# Patient Record
Sex: FEMALE | Race: WHITE | NOT HISPANIC OR LATINO | URBAN - METROPOLITAN AREA
[De-identification: names, ages, dates, MRNs, and addresses within clinical notes are randomized per-mention and may not be internally consistent; named-entity substitution may affect disease eponyms.]

---

## 2021-09-08 ENCOUNTER — APPOINTMENT (OUTPATIENT)
Dept: ULTRASOUND IMAGING | Age: 26
End: 2021-09-08
Attending: EMERGENCY MEDICINE

## 2021-09-08 ENCOUNTER — HOSPITAL ENCOUNTER (EMERGENCY)
Age: 26
Discharge: HOME | End: 2021-09-08
Attending: EMERGENCY MEDICINE

## 2021-09-08 VITALS
HEART RATE: 68 BPM | DIASTOLIC BLOOD PRESSURE: 75 MMHG | OXYGEN SATURATION: 100 % | TEMPERATURE: 97.9 F | SYSTOLIC BLOOD PRESSURE: 109 MMHG | RESPIRATION RATE: 14 BRPM

## 2021-09-08 DIAGNOSIS — O03.9 MISCARRIAGE: Primary | ICD-10-CM

## 2021-09-08 LAB
ABO + RH BLD: NORMAL
ALBUMIN SERPL-MCNC: 3.8 G/DL (ref 3.6–5.1)
ALBUMIN/GLOB SERPL: 1.1 {RATIO} (ref 1–2.4)
ALP SERPL-CCNC: 54 UNITS/L (ref 45–117)
ALT SERPL-CCNC: 24 UNITS/L
ANION GAP SERPL CALC-SCNC: 10 MMOL/L (ref 10–20)
APPEARANCE UR: ABNORMAL
AST SERPL-CCNC: 15 UNITS/L
BACTERIA #/AREA URNS HPF: ABNORMAL /HPF
BASOPHILS # BLD: 0 K/MCL (ref 0–0.3)
BASOPHILS NFR BLD: 1 %
BILIRUB SERPL-MCNC: 0.5 MG/DL (ref 0.2–1)
BILIRUB UR QL STRIP: NEGATIVE
BUN SERPL-MCNC: 11 MG/DL (ref 6–20)
BUN/CREAT SERPL: 14 (ref 7–25)
CALCIUM SERPL-MCNC: 9.1 MG/DL (ref 8.4–10.2)
CHLORIDE SERPL-SCNC: 110 MMOL/L (ref 98–107)
CO2 SERPL-SCNC: 25 MMOL/L (ref 21–32)
COLOR UR: YELLOW
CREAT SERPL-MCNC: 0.79 MG/DL (ref 0.51–0.95)
DEPRECATED RDW RBC: 39.6 FL (ref 39–50)
EOSINOPHIL # BLD: 0 K/MCL (ref 0–0.5)
EOSINOPHIL NFR BLD: 0 %
ERYTHROCYTE [DISTWIDTH] IN BLOOD: 11.7 % (ref 11–15)
FASTING DURATION TIME PATIENT: ABNORMAL H
GFR SERPLBLD BASED ON 1.73 SQ M-ARVRAT: >90 ML/MIN
GLOBULIN SER-MCNC: 3.4 G/DL (ref 2–4)
GLUCOSE SERPL-MCNC: 92 MG/DL (ref 65–99)
GLUCOSE UR STRIP-MCNC: NEGATIVE MG/DL
HCG SERPL-ACNC: 4 MUNITS/ML
HCT VFR BLD CALC: 37.8 % (ref 36–46.5)
HGB BLD-MCNC: 12.8 G/DL (ref 12–15.5)
HGB UR QL STRIP: ABNORMAL
HYALINE CASTS #/AREA URNS LPF: ABNORMAL /LPF
IMM GRANULOCYTES # BLD AUTO: 0 K/MCL (ref 0–0.2)
IMM GRANULOCYTES # BLD: 0 %
KETONES UR STRIP-MCNC: ABNORMAL MG/DL
LEUKOCYTE ESTERASE UR QL STRIP: ABNORMAL
LYMPHOCYTES # BLD: 2.1 K/MCL (ref 1–4.8)
LYMPHOCYTES NFR BLD: 31 %
MCH RBC QN AUTO: 31.4 PG (ref 26–34)
MCHC RBC AUTO-ENTMCNC: 33.9 G/DL (ref 32–36.5)
MCV RBC AUTO: 92.9 FL (ref 78–100)
MONOCYTES # BLD: 0.4 K/MCL (ref 0.3–0.9)
MONOCYTES NFR BLD: 6 %
MUCOUS THREADS URNS QL MICRO: PRESENT
NEUTROPHILS # BLD: 4.1 K/MCL (ref 1.8–7.7)
NEUTROPHILS NFR BLD: 62 %
NITRITE UR QL STRIP: NEGATIVE
NRBC BLD MANUAL-RTO: 0 /100 WBC
PH UR STRIP: 5 [PH] (ref 5–7)
PLATELET # BLD AUTO: 192 K/MCL (ref 140–450)
POTASSIUM SERPL-SCNC: 4.5 MMOL/L (ref 3.4–5.1)
PROT SERPL-MCNC: 7.2 G/DL (ref 6.4–8.2)
PROT UR STRIP-MCNC: 30 MG/DL
RAINBOW EXTRA TUBES HOLD SPECIMEN: NORMAL
RBC # BLD: 4.07 MIL/MCL (ref 4–5.2)
RBC #/AREA URNS HPF: >100 /HPF
SODIUM SERPL-SCNC: 140 MMOL/L (ref 135–145)
SP GR UR STRIP: 1.01 (ref 1–1.03)
SQUAMOUS #/AREA URNS HPF: ABNORMAL /HPF
UROBILINOGEN UR STRIP-MCNC: 0.2 MG/DL
WBC # BLD: 6.6 K/MCL (ref 4.2–11)
WBC #/AREA URNS HPF: ABNORMAL /HPF

## 2021-09-08 PROCEDURE — 99284 EMERGENCY DEPT VISIT MOD MDM: CPT | Performed by: EMERGENCY MEDICINE

## 2021-09-08 PROCEDURE — 81001 URINALYSIS AUTO W/SCOPE: CPT | Performed by: EMERGENCY MEDICINE

## 2021-09-08 PROCEDURE — 87077 CULTURE AEROBIC IDENTIFY: CPT | Performed by: EMERGENCY MEDICINE

## 2021-09-08 PROCEDURE — 85025 COMPLETE CBC W/AUTO DIFF WBC: CPT | Performed by: EMERGENCY MEDICINE

## 2021-09-08 PROCEDURE — 36415 COLL VENOUS BLD VENIPUNCTURE: CPT

## 2021-09-08 PROCEDURE — 84702 CHORIONIC GONADOTROPIN TEST: CPT | Performed by: EMERGENCY MEDICINE

## 2021-09-08 PROCEDURE — 80053 COMPREHEN METABOLIC PANEL: CPT | Performed by: EMERGENCY MEDICINE

## 2021-09-08 PROCEDURE — 86900 BLOOD TYPING SEROLOGIC ABO: CPT | Performed by: EMERGENCY MEDICINE

## 2021-09-08 PROCEDURE — 76830 TRANSVAGINAL US NON-OB: CPT

## 2021-09-08 PROCEDURE — 76856 US EXAM PELVIC COMPLETE: CPT

## 2021-09-08 PROCEDURE — 99284 EMERGENCY DEPT VISIT MOD MDM: CPT

## 2021-09-08 ASSESSMENT — PAIN SCALES - GENERAL: PAINLEVEL_OUTOF10: 2

## 2021-09-08 ASSESSMENT — PAIN DESCRIPTION - PAIN TYPE: TYPE: ACUTE PAIN

## 2021-09-09 LAB — BACTERIA UR CULT: ABNORMAL

## 2021-12-15 PROBLEM — Z34.01 ENCOUNTER FOR SUPERVISION OF NORMAL FIRST PREGNANCY IN FIRST TRIMESTER (HCC): Status: ACTIVE | Noted: 2021-12-15

## 2021-12-15 PROBLEM — Z34.01 ENCOUNTER FOR SUPERVISION OF NORMAL FIRST PREGNANCY IN FIRST TRIMESTER: Status: ACTIVE | Noted: 2021-12-15

## 2021-12-22 PROBLEM — O98.512 COVID-19 AFFECTING PREGNANCY IN SECOND TRIMESTER: Status: ACTIVE | Noted: 2021-12-21

## 2021-12-22 PROBLEM — U07.1 COVID-19 AFFECTING PREGNANCY IN SECOND TRIMESTER (HCC): Status: ACTIVE | Noted: 2021-12-21

## 2021-12-22 PROBLEM — U07.1 COVID-19 AFFECTING PREGNANCY IN SECOND TRIMESTER: Status: ACTIVE | Noted: 2021-12-21

## 2021-12-22 PROBLEM — O98.512 COVID-19 AFFECTING PREGNANCY IN SECOND TRIMESTER (HCC): Status: ACTIVE | Noted: 2021-12-21

## 2022-02-08 PROBLEM — Z34.00 SUPERVISION OF NORMAL FIRST PREGNANCY, ANTEPARTUM: Status: ACTIVE | Noted: 2021-12-15

## 2022-02-08 PROBLEM — Z34.00 SUPERVISION OF NORMAL FIRST PREGNANCY, ANTEPARTUM (HCC): Status: ACTIVE | Noted: 2021-12-15

## 2023-08-04 LAB
AMB EXT CHLAMYDIA, NUCLEIC ACID AMP: NEGATIVE
AMB EXT GONOCOCCUS, NUCLEIC ACID AMP: NEGATIVE

## 2023-08-30 LAB
AMB EXT HEMATOCRIT: 38.3
AMB EXT HEMOGLOBIN: 13.2
AMB EXT MCV: 89.7
AMB EXT PLATELETS: 190
ANTIBODY SCREEN OB: NEGATIVE
HEPATITIS B SURFACE ANTIGEN OB: NEGATIVE
HIV RESULT OB: NEGATIVE
RAPID PLASMA REAGIN OB: NONREACTIVE
RH FACTOR OB: POSITIVE

## 2024-01-08 LAB
AMB EXT GLUCOSE 1HR OB: 122
AMB EXT HEMATOCRIT: 33.2
AMB EXT HEMOGLOBIN: 11.2
AMB EXT PLATELETS: 175
HIV RESULT OB: NEGATIVE

## 2024-02-05 ENCOUNTER — HOSPITAL ENCOUNTER (INPATIENT)
Facility: HOSPITAL | Age: 29
LOS: 1 days | Discharge: HOME | End: 2024-02-06
Attending: OBSTETRICS & GYNECOLOGY | Admitting: OBSTETRICS & GYNECOLOGY
Payer: COMMERCIAL

## 2024-02-05 PROBLEM — Z34.90 PREGNANCY (HCC): Status: ACTIVE | Noted: 2024-02-05

## 2024-02-05 PROBLEM — Z34.90 PREGNANCY: Status: ACTIVE | Noted: 2024-02-05

## 2024-02-05 LAB
ANTIBODY SCREEN: NEGATIVE
BASOPHILS # BLD AUTO: 0.01 X10(3) UL (ref 0–0.2)
BASOPHILS NFR BLD AUTO: 0.1 %
BILIRUB UR QL: NEGATIVE
COLOR UR: YELLOW
DEPRECATED RDW RBC AUTO: 42.3 FL (ref 35.1–46.3)
EOSINOPHIL # BLD AUTO: 0.05 X10(3) UL (ref 0–0.7)
EOSINOPHIL NFR BLD AUTO: 0.6 %
ERYTHROCYTE [DISTWIDTH] IN BLOOD BY AUTOMATED COUNT: 12.2 % (ref 11–15)
FIBRONECTIN FETAL SPEC QL: NEGATIVE
GLUCOSE UR-MCNC: NORMAL MG/DL
HCT VFR BLD AUTO: 33.4 %
HGB BLD-MCNC: 11.1 G/DL
HGB UR QL STRIP.AUTO: NEGATIVE
IMM GRANULOCYTES # BLD AUTO: 0.05 X10(3) UL (ref 0–1)
IMM GRANULOCYTES NFR BLD: 0.6 %
KETONES UR-MCNC: 40 MG/DL
LEUKOCYTE ESTERASE UR QL STRIP.AUTO: NEGATIVE
LYMPHOCYTES # BLD AUTO: 1.3 X10(3) UL (ref 1–4)
LYMPHOCYTES NFR BLD AUTO: 16.1 %
MCH RBC QN AUTO: 31.9 PG (ref 26–34)
MCHC RBC AUTO-ENTMCNC: 33.2 G/DL (ref 31–37)
MCV RBC AUTO: 96 FL
MONOCYTES # BLD AUTO: 0.22 X10(3) UL (ref 0.1–1)
MONOCYTES NFR BLD AUTO: 2.7 %
NEUTROPHILS # BLD AUTO: 6.44 X10 (3) UL (ref 1.5–7.7)
NEUTROPHILS # BLD AUTO: 6.44 X10(3) UL (ref 1.5–7.7)
NEUTROPHILS NFR BLD AUTO: 79.9 %
NITRITE UR QL STRIP.AUTO: NEGATIVE
PH UR: 5.5 [PH] (ref 5–8)
PLATELET # BLD AUTO: 144 10(3)UL (ref 150–450)
PROT UR-MCNC: NEGATIVE MG/DL
RBC # BLD AUTO: 3.48 X10(6)UL
RH BLOOD TYPE: POSITIVE
SP GR UR STRIP: 1.02 (ref 1–1.03)
UROBILINOGEN UR STRIP-ACNC: NORMAL
WBC # BLD AUTO: 8.1 X10(3) UL (ref 4–11)

## 2024-02-05 PROCEDURE — 86900 BLOOD TYPING SEROLOGIC ABO: CPT | Performed by: OBSTETRICS & GYNECOLOGY

## 2024-02-05 PROCEDURE — 86901 BLOOD TYPING SEROLOGIC RH(D): CPT | Performed by: OBSTETRICS & GYNECOLOGY

## 2024-02-05 PROCEDURE — 99204 OFFICE O/P NEW MOD 45 MIN: CPT

## 2024-02-05 PROCEDURE — 82731 ASSAY OF FETAL FIBRONECTIN: CPT | Performed by: OBSTETRICS & GYNECOLOGY

## 2024-02-05 PROCEDURE — 85025 COMPLETE CBC W/AUTO DIFF WBC: CPT | Performed by: OBSTETRICS & GYNECOLOGY

## 2024-02-05 PROCEDURE — 86850 RBC ANTIBODY SCREEN: CPT | Performed by: OBSTETRICS & GYNECOLOGY

## 2024-02-05 PROCEDURE — 81001 URINALYSIS AUTO W/SCOPE: CPT | Performed by: OBSTETRICS & GYNECOLOGY

## 2024-02-05 RX ORDER — PROGESTERONE 100 MG/1
100 CAPSULE ORAL NIGHTLY
COMMUNITY

## 2024-02-05 RX ORDER — CEFAZOLIN SODIUM/WATER 2 G/20 ML
2 SYRINGE (ML) INTRAVENOUS ONCE
Status: COMPLETED | OUTPATIENT
Start: 2024-02-05 | End: 2024-02-05

## 2024-02-05 RX ORDER — SODIUM CHLORIDE, SODIUM LACTATE, POTASSIUM CHLORIDE, CALCIUM CHLORIDE 600; 310; 30; 20 MG/100ML; MG/100ML; MG/100ML; MG/100ML
INJECTION, SOLUTION INTRAVENOUS CONTINUOUS
Status: DISCONTINUED | OUTPATIENT
Start: 2024-02-05 | End: 2024-02-06

## 2024-02-05 RX ORDER — NIFEDIPINE 10 MG/1
10 CAPSULE ORAL EVERY 6 HOURS SCHEDULED
Status: DISCONTINUED | OUTPATIENT
Start: 2024-02-05 | End: 2024-02-06

## 2024-02-05 RX ORDER — BETAMETHASONE SODIUM PHOSPHATE AND BETAMETHASONE ACETATE 3; 3 MG/ML; MG/ML
12 INJECTION, SUSPENSION INTRA-ARTICULAR; INTRALESIONAL; INTRAMUSCULAR; SOFT TISSUE EVERY 24 HOURS
Status: COMPLETED | OUTPATIENT
Start: 2024-02-05 | End: 2024-02-06

## 2024-02-05 RX ORDER — ZOLPIDEM TARTRATE 5 MG/1
5 TABLET ORAL NIGHTLY PRN
Status: DISCONTINUED | OUTPATIENT
Start: 2024-02-05 | End: 2024-02-06

## 2024-02-05 RX ORDER — CHOLECALCIFEROL (VITAMIN D3) 25 MCG
1 TABLET,CHEWABLE ORAL DAILY
Status: DISCONTINUED | OUTPATIENT
Start: 2024-02-05 | End: 2024-02-06

## 2024-02-05 RX ORDER — DOCUSATE SODIUM 100 MG/1
100 CAPSULE, LIQUID FILLED ORAL 2 TIMES DAILY
Status: DISCONTINUED | OUTPATIENT
Start: 2024-02-05 | End: 2024-02-06

## 2024-02-05 RX ORDER — CALCIUM CARBONATE 500 MG/1
1000 TABLET, CHEWABLE ORAL
Status: DISCONTINUED | OUTPATIENT
Start: 2024-02-05 | End: 2024-02-06

## 2024-02-05 RX ORDER — ACETAMINOPHEN 500 MG
1000 TABLET ORAL EVERY 6 HOURS PRN
Status: DISCONTINUED | OUTPATIENT
Start: 2024-02-05 | End: 2024-02-06

## 2024-02-05 RX ORDER — ACETAMINOPHEN 500 MG
500 TABLET ORAL EVERY 6 HOURS PRN
Status: DISCONTINUED | OUTPATIENT
Start: 2024-02-05 | End: 2024-02-06

## 2024-02-05 NOTE — H&P
[facility]    History & Physical    Montse Arias Patient Status:  Inpatient    1995 MRN [id number]   Location [facility] Attending Rachel Mayorga MD   Hosp Day # 0 PCP [place] DO     Date of Admission:  2024      HPI:   Montse Arias is a 28 year old  female, current EGA of 32w6d with an estimated date of delivery of: 3/26/2024, by Patient Reported who presents due to painful contractions. Pt denies any vaginal bleeding or leakage of fluid. Denies anything intercourse.  Pt has been followed this pregnancy by MFM for a short cervix found at her anatomy ultrasound. She has been on vaginal progesterone. Being admitted for observation.    Last [facility] ultrasound 2024 CL 20.9mm, was previously 19mm.    History   Obstetric History:   OB History    Para Term  AB Living   3 0 0 0 1 1   SAB IAB Ectopic Multiple Live Births   1 0 0 0 0      # Outcome Date GA Lbr Sean/2nd Weight Sex Delivery Anes PTL Lv   3 Current            2 SAB 2021           1             Term  at 39 weeks    Gyne History: no significant history    Past Medical History:   Past Medical History:   Diagnosis Date    COVID-19 virus infection 2021       Past Surgerical History:   Past Surgical History:   Procedure Laterality Date    OTHER      mucocele removal x 3       Social History:   Social History     Tobacco Use    Smoking status: Never    Smokeless tobacco: Never   Substance Use Topics    Alcohol use: Not Currently        Allergies/Medications:   Allergies:   No Known Allergies    Medications:  Medications Prior to Admission   Medication Sig Dispense Refill Last Dose    progesterone 100 MG Oral Cap Take 1 capsule (100 mg total) by mouth nightly.       Prenatal MV-Min-Fe Fum-FA-DHA (PRENATAL 1 OR) Take by mouth.            Review of Systems:   As documented in HPI      Physical Exam:   Temp:  [98.2 °F (36.8 °C)] 98.2 °F (36.8  °C)  Pulse:  [87] 87  Resp:  [18] 18  BP: (112)/(64) 112/64    Constitutional: alert and cooperative in No distress    Abdomen: gravid nontender EFW 4lbs    Vaginal exam: 1.5/50/-2     FHT assessment:   Moderate variability, (+) accels, no decels, Cat I    Results:     Recent Results (from the past 24 hour(s))   Urinalysis with Culture Reflex    Collection Time: 24  3:13 AM    Specimen: Urine, clean catch   Result Value Ref Range    Urine Color Yellow Yellow    Clarity Urine Turbid (A) Clear    Spec Gravity 1.018 1.005 - 1.030    Glucose Urine Normal Normal mg/dL    Bilirubin Urine Negative Negative    Ketones Urine 40 (A) Negative mg/dL    Blood Urine Negative Negative    pH Urine 5.5 5.0 - 8.0    Protein Urine Negative Negative mg/dL    Urobilinogen Urine Normal Normal    Nitrite Urine Negative Negative    Leukocyte Esterase Urine Negative Negative    WBC Urine 1-5 0 - 5 /HPF    RBC Urine None Seen 0 - 2 /HPF    Bacteria Urine 1+ (A) None Seen /HPF    Squamous Epi. Cells Few (A) None Seen /HPF    Renal Tubular Epithelial Cells None Seen None Seen /HPF    Transitional Cells None Seen None Seen /HPF    Yeast Urine None Seen None Seen /HPF       No results found.        Assessment/Plan:   IUP 32w6d with  labor    Treatment Plan:  Plan for IV hydration, Procardia 10mg PO q 6 hours.  Ampicillin for GBS prophylaxis  Continue vaginal progesterone.  BMTZ for FLM  MFM consult    UA with +1 bacteria, await urine culture. Will do one dose of Ancef now.      Risks, benefits, alternatives and possible complications have been discussed in detail with the patient.   Pre-admission, admission, and post admission procedures and expectations were discussed in detail.    All questions answered; all appropriate consents will be signed at the Hospital.        Rachel Mayorga MD  2024  4:13 AM p

## 2024-02-05 NOTE — PROGRESS NOTES
Pt is a 28 year old female admitted to TR1/TR1-A.     Chief Complaint   Patient presents with    R/o  Labor     Pt complaining of lower abdominal cramping       Pt is  32w6d intra-uterine pregnancy.  History obtained, consents signed. Oriented to room, staff, and plan of care.

## 2024-02-05 NOTE — CONSULTS
[facility]  Maternal-Fetal Medicine Inpatient Consultation    Date of Admission:  2024  Date of Consult:  2024    Reason for Consult:   A maternal-fetal medicine consultation was requested secondary to  Labor.    History of Present Illness:  Montse Arias is a 28 year old female  with a orona pregnancy and an Estimated Date of Delivery: 3/26/24.        She felt left-sided constant pain this morning on her abdomen.  She did not think she was having contractions.  But the pain was enough that they decided to come into labor and delivery.  No leaking water, no bleeding.  After she arrived she did start to feel contractions.  Positive fetal movement.    Her prenatal records and labs were reviewed.    Antepartum risk factors  Recurrent pregnancy loss  Short cervix on vaginal progesterone  ROS    HISTORY  OB History    Para Term  AB Living   3 0 0 0 1 1   SAB IAB Ectopic Multiple Live Births   1 0 0 0 0      # Outcome Date GA Lbr Sean/2nd Weight Sex Delivery Anes PTL Lv   3 Current            2 SAB 2021           1                 Allergies:  No Known Allergies   Current Meds:  No current outpatient medications on file.        Medications:    Current Facility-Administered Medications:     lactated ringers infusion, , Intravenous, Continuous    acetaminophen (Tylenol Extra Strength) tab 500 mg, 500 mg, Oral, Q6H PRN **OR** acetaminophen (Tylenol Extra Strength) tab 1,000 mg, 1,000 mg, Oral, Q6H PRN    zolpidem (Ambien) tab 5 mg, 5 mg, Oral, Nightly PRN    docusate sodium (Colace) cap 100 mg, 100 mg, Oral, BID    calcium carbonate (Tums) chewable tab 1,000 mg, 1,000 mg, Oral, Daily PRN    prenatal vitamin with DHA (PNV with DHA) cap 1 capsule, 1 capsule, Oral, Daily    NIFEdipine (Procardia) cap 10 mg, 10 mg, Oral, 4 times per day    betamethasone sodium phosphate & acetate (Celestone) 6 (3-3) MG/ML injection 12 mg, 12 mg, Intramuscular, Q24H    ampicillin  (Omnipen) 2 g in sodium chloride 0.9% 100 mL IVPB-MBP, 2 g, Intravenous, Once **FOLLOWED BY** ampicillin (Omnipen) 1 g in sodium chloride 0.9% 100 mL IVPB-MBP, 1 g, Intravenous, Q4H  HISTORY:  Past Medical History:   Diagnosis Date    COVID-19 virus infection 12/21/2021      Past Surgical History:   Procedure Laterality Date    OTHER      mucocele removal x 3      Family History   Problem Relation Age of Onset    Hypertension Mother       Social History     Socioeconomic History    Marital status: Single   Tobacco Use    Smoking status: Never    Smokeless tobacco: Never   Vaping Use    Vaping Use: Never used   Substance and Sexual Activity    Alcohol use: Not Currently    Drug use: Never    Sexual activity: Yes     Partners: Male     Social Determinants of Health     Financial Resource Strain: Low Risk  (2/5/2024)    Financial Resource Strain     Difficulty of Paying Living Expenses: Not hard at all     Med Affordability: No   Food Insecurity: No Food Insecurity (2/5/2024)    Food Insecurity     Food Insecurity: Never true   Transportation Needs: No Transportation Needs (2/5/2024)    Transportation Needs     Lack of Transportation: No   Stress: No Stress Concern Present (2/5/2024)    Stress     Feeling of Stress : No   Housing Stability: Low Risk  (2/5/2024)    Housing Stability     Housing Instability: No          PHYSICAL EXAMINATION:  BP 91/43 (BP Location: Right arm)   Pulse 80   Temp 97.9 °F (36.6 °C) (Oral)   Resp 16   Ht 5' 6\" (1.676 m)   Wt 170 lb (77.1 kg)   BMI 27.44 kg/m²   Physical Exam  Constitutional:       General: She is not in acute distress.     Appearance: Normal appearance. She is not ill-appearing.   Abdominal:      Palpations: Abdomen is soft.      Tenderness: There is no abdominal tenderness.   Neurological:      Mental Status: She is alert.   Psychiatric:         Mood and Affect: Mood normal.         Behavior: Behavior normal.           Laboratory Data:  Lab Results   Component Value  Date    WBC 8.1 2024    HGB 11.1 2024    HCT 33.4 2024    .0 2024        ANTIBODY SCREEN OB   Date Value Ref Range Status   2023 Negative Negative Final     RUBELLA ANTIBODIES, IGG OB   Date Value Ref Range Status   2023 Immune Immune Final     HEPATITIS B SURFACE ANTIGEN OB   Date Value Ref Range Status   2023 Negative Negative, Unknown Final     RAPID PLASMA REAGIN OB   Date Value Ref Range Status   2023 Nonreactive Nonreactive, Equivocal Final       24  OB ULTRASOUND REPORT   See imaging tab for complete ultrasound report or in PACS    Single IUP in cephalic presentation.  Cardiac activity is present at 142 bpm.  Placenta is Posterior.  A 3 vessel cord is noted.  EFW 1658 g (3 lb 10 oz) 34%.  LAURO is 18.4 cm. MVP is 6.1 cm.    BIOPHYSICAL PROFILE:  Movement: 2/2  Tone: 2/2  Breathin/2  Fluid: 2/2  TOTAL:     See PACS/Imaging Tab For Complete Ultrasound Report  I interpreted the results and reviewed them with the patient.          NARRATIVE:    LABOR    Background   birth is the leading cause of  mortality and the most common reason for  hospitalization. In the [place], approximately 12% of all live births occur before term, and  labor preceded approximately 50% of these  births. Although the causes of  labor are not well understood, the burden of  births is clear-- births account for approximately 70% of  deaths and 36% of infant deaths as well as 25-50% of cases of long-term neurologic impairment in children.    Definition   birth is defined as birth between 20 0/7 weeks of gestation and 36 6/7 weeks of gestation. The diagnosis of  labor generally is based on clinical criteria of regular uterine contractions accompanied by a change in cervical dilation, effacement, or both or initial presentation with regular contractions and cervical dilation of at least  2 cm. Less than 10% of women with the clinical diagnosis of  labor actually give birth within 7 days of presentation.    Screening / Identification  Although the results of observational studies have suggested that knowledge of fetal fibronectin status or cervical length may help health care providers reduce use of unnecessary resources, these findings have not been confirmed by randomized trials. Further, the positive predictive value of a positive fetal fibronectin test result or a short cervix alone is poor and should not be used exclusively to direct management in the setting of acute symptoms.  Identifying women with  labor who ultimately will give birth  is difficult. Approximately 30% of  labor spontaneously resolves and 50% of patients hospitalized for  labor actually give birth at term.     Interventions to reduce the likelihood of delivery should be reserved for women with  labor at a gestational age at which a delay in delivery will provide benefit to the . Because tocolytic therapy generally is effective for up to 48 hours, only women with fetuses that would benefit from a 48-hour delay in delivery should receive tocolytic treatment.    Treatment Principles  Nonpharmacologic treatments to prevent  births in women with  labor have included bed rest, abstention from intercourse and orgasm, and hydration. Evidence for the effectiveness of these interventions is lacking, and adverse effects have been reported. therapeutic agents currently thought to be clearly associated with improved  outcomes include  corticosteroids, for maturation of fetal lungs and other developing organ systems, and the targeted use of magnesium sulfate for fetal neuroprotection. In general, tocolytics are not indicated for use before  viability. Regardless of interventions,  morbidity and mortality at that time are too high to justify the  maternal risks associated with tocolytic therapy. Similarly, no data exist regarding the efficacy of corticosteroid use before viability. However, there may be times when it is appropriate to administer tocolytics before viability. For example, inhibiting contractions in a patient after an event known to cause  labor, such as intra-abdominal surgery, may be reasonable even at previable gestational ages, although the efficacy of such an intervention remains unproved,    The upper limit for the use of tocolytic agents to prevent  birth generally has been 34 weeks of gestation. Because of the possible risks associated with tocolytic and steroid therapies, the use of these drugs should be limited to women with  labor at high risk of spontaneous  birth. Tocolysis is contraindicated when the maternal and fetal risks of prolonging pregnancy or the risks associated with these drugs are greater than the risks associated with  birth.    Common contraindications include:  Intrauterine fetal demise  Lethal fetal anomaly  Severe preeclampsia or eclampsia  Maternal bleeding with hemodynamic instability  Chorioamnionitis   premature rupture of membranes (in the absence of infection may be considered for steroid administration)  Maternal contraindications to tocolysis (agent specific)     contractions are common; however, these contractions do not reliably predict which women will have subsequent progressive cervical change. In a study of 763 women who had unscheduled triage visits for symptoms of  labor, only 18% gave birth before 37 weeks of gestation and only 3% gave birth within 2 weeks of presenting with symptoms.  No evidence exists to support the use of prophylactic tocolytic therapy, home uterine activity monitoring, cerclage, or narcotics to prevent  delivery in women with contractions but no cervical change. Therefore, women with  contractions without  cervical change, especially those with a cervical dilation of less than 2 cm, generally should not be treated with tocolytics.    Corticosteroids  The most beneficial intervention for improvement of  outcomes among patients who give birth  is the administration of  corticosteroids. A single course of corticosteroids is recommended for pregnant women between 24 weeks and 34 weeks of gestation, and may be considered for pregnant women starting at 23 weeks of gestation, who are at risk of  delivery within 7 days.    Neonates whose mothers receive  corticosteroids have significantly lower severity, frequency, or both of respiratory distress syndrome (relative risk [RR], 0.66; 95% confidence interval [CI], 0.59-0.73), intracranial hemorrhage (RR, 0.54; 95% CI, 0.43-0.69), necrotizing enterocolitis (RR, 0.46; 95% CI, 0.29-0.74), and death (RR, 0.69; 95% CI, 0.58-0.81), compared with neonates whose mothers did not receive  corticosteroids.    A single repeat course of  corticosteroids should be considered in women whose prior course of  corticosteroids was administered at least 7 days previously and who remain at risk of  birth before 34 weeks of gestation.  However, regularly scheduled repeat courses or multiple courses (more than two) are not currently recommended.    Because treatment with corticosteroids for less than 24 hours is still associated with significant reductions in  morbidity and mortality, a first dose of  corticosteroids should still be administered even if the ability to give the second dose is unlikely, based on the clinical scenario. However, no additional benefit has been demonstrated for courses of  steroids with dosage intervals shorter than those outlined previously, often referred to as accelerated dosing, even when delivery appears imminent.  Tocolytic Therapy  Tocolytic therapy may provide  short-term prolongation of pregnancy, enabling the administration of  corticosteroids and magnesium sulfate for neuroprotection, as well as transport, if indicated, to a tertiary facility. However, no evidence exists that tocolytic therapy has any direct favorable effect on  outcomes or that any prolongation of pregnancy afforded by tocolytics actually translates into statistically significant  benefit.    The use of magnesium sulfate to inhibit acute  labor has similar limitations when used for pregnancy prolongation.  However, if magnesium sulfate is being used in the context of  labor for fetal neuroprotection and the patient is still experiencing  labor, a different agent could be considered for short-term tocolysis. However, because of potential serious maternal complications, beta-adrenergic receptor agonists and calcium-channel blockers should be used with caution in combination with magnesium sulfate for this indication. Before 32 weeks of gestation, indomethacin is a potential option for use in conjunction with magnesium sulfate.    Maintenance therapy with tocolytics is ineffective for preventing  birth and improving  outcomes and is not recommended for this purpose. A meta-analysis has not shown any differences between magnesium sulfate maintenance therapy and either placebo or beta-adrenergic receptor agonists in preventing  birth after an initial treated episode of threatened  labor.    Antibiotics  A meta-analysis of eight randomized controlled trials that compared antibiotic treatment with placebo for patients with documented  labor found no difference between the antibiotic treatment and placebo for prolonging pregnancy or preventing  delivery, respiratory distress syndrome, or  sepsis. In fact, antibiotic use may be associated with long-term harm. Thus, antibiotics should not be used to prolong gestation  or improve  outcomes in women with  labor and intact membranes. This recommendation is distinct from recommendations for antibiotic use for pre-term premature rupture of membranes and group B streptococci carrier status.    Nonpharmocologic Treatments  Although bed rest and hydration have been recommended to women with symptoms of  labor to prevent  delivery, these measures have not been shown to be effective for the prevention of  birth and should not be routinely recommended. Furthermore, the potential harm, including venous thromboembolism, bone demineralization, and deconditioning, and the negative effects, such as loss of employment, should not be underestimated.      IMPRESSION:   IUP at 32w6d   Recurrent pregnancy loss.  Short cervix   contractions    RECOMMENDATIONS:   Complete betamethasone course  OK to discontinue procardia after betamethasone administered tomorrow morning.  OK to discharge home after betamethasone and if no cervical change or further concern for  labor.     Thank you for allowing me to participate in the care of your patient.  Please do not hesitate to contact me if additional questions or concerns arise.  A total of 40 minutes were spent in review of records, consultation and coordination of care.  Our discussion is summarized above.       Esther Blackman MD  2024  10:27 AM

## 2024-02-06 VITALS
HEIGHT: 66 IN | BODY MASS INDEX: 27.32 KG/M2 | HEART RATE: 81 BPM | DIASTOLIC BLOOD PRESSURE: 54 MMHG | RESPIRATION RATE: 16 BRPM | SYSTOLIC BLOOD PRESSURE: 103 MMHG | WEIGHT: 170 LBS | TEMPERATURE: 98 F

## 2024-02-06 PROCEDURE — 87086 URINE CULTURE/COLONY COUNT: CPT | Performed by: OBSTETRICS & GYNECOLOGY

## 2024-02-06 RX ORDER — NIFEDIPINE 10 MG/1
10 CAPSULE ORAL EVERY 6 HOURS SCHEDULED
Qty: 112 CAPSULE | Refills: 0 | Status: SHIPPED | OUTPATIENT
Start: 2024-02-06

## 2024-02-06 NOTE — PAYOR COMM NOTE
--------------    OBSERVATION STATUS        ADMISSION REVIEW     Payor: [facility]   Subscriber #:  [id number]  Authorization Number: [id number]    Admit date: 24  Admit time:  4:21 AM       REVIEW DOCUMENTATION:    Date of Admission:  2024        HPI:   Montse Arias is a 28 year old  female, current EGA of 32w6d with an estimated date of delivery of: 3/26/2024, by Patient Reported who presents due to painful contractions. Pt denies any vaginal bleeding or leakage of fluid. Denies anything intercourse.  Pt has been followed this pregnancy by [facility] for a short cervix found at her anatomy ultrasound. She has been on vaginal progesterone. Being admitted for observation.     Last [facility] ultrasound 2024 CL 20.9mm, was previously 19mm.     History   Obstetric History:                    OB History    Para Term  AB Living   3 0 0 0 1 1   SAB IAB Ectopic Multiple Live Births      1 0 0 0 0          Assessment/Plan:   IUP 32w6d with  labor     Treatment Plan:  Plan for IV hydration, Procardia 10mg PO q 6 hours.  Ampicillin for GBS prophylaxis  Continue vaginal progesterone.  BMTZ for FLM  MFM consult     UA with +1 bacteria, await urine culture. Will do one dose of Ancef now.          MEDICATIONS ADMINISTERED IN LAST 1 DAY:  ampicillin (Omnipen) 1 g in sodium chloride 0.9% 100 mL IVPB-MBP       Date Action Dose Route User    Discharged on 2024 1339 New Bag 1 g Intravenous Taylor Shankar RN          betamethasone sodium phosphate & acetate (Celestone) 6 (3-3) MG/ML injection 12 mg       Date Action Dose Route User    Discharged on 2024 0453 Given 12 mg Intramuscular (Left Leg) Paolo Cotton RN          lactated ringers infusion       Date Action Dose Route User    Discharged on 2024 2130 New Bag (none) Intravenous Paolo Cotton RN    2024 1245 New Bag (none) Intravenous Taylor Shankar RN          NIFEdipine (Procardia) cap 10  mg       Date Action Dose Route User    Discharged on 2/6/2024 2/6/2024 1127 Given 10 mg Oral Gely Sidhu, RN    2/6/2024 0453 Given 10 mg Oral Paolo Cotton, RN    2/5/2024 2300 Given 10 mg Oral Paolo Cotton, RN    2/5/2024 1640 Given 10 mg Oral Taylor Shankar, RN            Vitals (last day) before discharge       Date/Time Temp Pulse Resp BP SpO2 Weight O2 Device O2 Flow Rate (L/min) [facility]    02/06/24 1015 98.4 °F (36.9 °C) 81 -- 103/54 -- -- -- -- HT    02/06/24 0400 98.3 °F (36.8 °C) 84 16 101/43 -- -- None (Room air) -- PAUL    02/05/24 2000 98.4 °F (36.9 °C) 76 16 101/46 -- -- None (Room air) -- PAUL    02/05/24 1600 98.1 °F (36.7 °C) -- 15 109/60 -- -- -- -- AE    02/05/24 1200 97.8 °F (36.6 °C) 86 -- 111/57 -- -- -- -- AE    02/05/24 0715 97.9 °F (36.6 °C) 80 16 91/43 -- 170 lb None (Room air) -- AE    02/05/24 0249 98.2 °F (36.8 °C) 87 18 112/64 -- -- None (Room air) -- CARLOS

## 2024-02-06 NOTE — PROGRESS NOTES
[facility]    OB/GYNE Antepartum note      Montse Arias Patient Status:  Inpatient    1995 MRN [id number]   Location [facility] Attending Rachel Mayorga MD   Hosp Day # 1 PCP [place], DO       Subjective     Pt denies F/C/CP/SOB, HA, blurry vision, dizziness,  RUQ pain, contractions, LOF, VB, ctx.    Pt reports feeling much better.    Pt reports feeling good FM.      Objective   Vital signs in last 24 hours:  Temp:  [97.8 °F (36.6 °C)-98.4 °F (36.9 °C)] 98.3 °F (36.8 °C)  Pulse:  [76-86] 84  Resp:  [15-16] 16  BP: (101-111)/(43-60) 101/43    Input/Output:    Intake/Output Summary (Last 24 hours) at 2024 0752  Last data filed at 2024 0700  Gross per 24 hour   Intake 3027.08 ml   Output --   Net 3027.08 ml       Constitutional: comfortable  CV: RRR nl S1/S2  Lungs: CTA biliaterally  Abdomen: S/ NT/ gravid  Extremities: No calf tenderness,  No pitting edema.    NST note:  FHT baseline: 130  Variability: moderate  Accels:  present  Decels: No  Tocos:  None  Category: 1 tracing    PELVIC: 1.5/50%/-2      Results:   Labs / Path / Radiology:    No results found for this or any previous visit (from the past 24 hour(s)).      No results found.        Assessment/Plan   IUP at 33w0d admitted for  contractions, Hospital Day: 1    Pregnancy    IUP at 33 0/7 wks  FWBR overall: category 1   contractions: no cervical change noted since admission; started on procardia while on BMTZ, MFM on consult.  Will continue procardia to 36 completed weeks.    : BMTZ x 2 given  Short cervix on vaginal progesterone: following MFM; MFM on consult  Discharge home.  Discussed labor precautions. Discussed labor precautions and kick counts.  She has an appointment tomorrow in the office.            Lissett Hobbs MD  2024  7:52 AM

## 2024-02-06 NOTE — DISCHARGE SUMMARY
[facility]    Obstetrical Discharge Summary    Montes Arias Patient Status:  Inpatient    1995 MRN [id number]   Location [facility] Attending Rachel Mayorga MD   Hosp Day # 1 PCP [place] DO     Date of Admission: 2024     Date of Discharge: 2024    Admitting Diagnosis: pregnancy  Pregnancy    Discharge Diagnosis: .Active Problems:    Pregnancy      Disposition: Home    Hospital Course:   Reason for Admission:  Montse Arias is a 28 year old  who was admitted with Estimated Date of Delivery: 3/26/24. She presented for   contractions .  Pregnancy was complicated by:  Patient Active Problem List    Diagnosis Date Noted    Pregnancy 2024    COVID-19 affecting pregnancy in second trimester 2021    Supervision of normal first pregnancy, antepartum 12/15/2021       Hospital Course: This is a 28 year old  who was admitted with Estimated Date of Delivery: 3/26/24 presented with pelvic pain.  Noted to have  contractions.  MFM placed on consult.  She was given Procardia as a tocolytic.  BMTZ for fetal lung maturity.  She was observed overnight.  In the morning patient reported feeling good and not feeling contractions.  Labor precautions discussed.  Will send pt home on procardia.  Pt has a follow up appt tomorrow with OB.    Discharge Physical Exam:   /43 (BP Location: Right arm)   Pulse 84   Temp 98.3 °F (36.8 °C) (Oral)   Resp 16   Ht 5' 6\" (1.676 m)   Wt 170 lb (77.1 kg)   BMI 27.44 kg/m²   General appearance:  alert, appears stated age, cooperative and no distress  Abdominal: soft,  nontender, no rebound; no guarding; Gravid  Pelvic: 1.5/50/-2  Extremities: Homans sign is negative, no sign of DVT; no c/c/e      Results:   Recent Results (from the past 336 hour(s))   Urinalysis with Culture Reflex    Collection Time: 24  3:13 AM    Specimen: Urine, clean catch   Result Value Ref  Range    Urine Color Yellow Yellow    Clarity Urine Turbid (A) Clear    Spec Gravity 1.018 1.005 - 1.030    Glucose Urine Normal Normal mg/dL    Bilirubin Urine Negative Negative    Ketones Urine 40 (A) Negative mg/dL    Blood Urine Negative Negative    pH Urine 5.5 5.0 - 8.0    Protein Urine Negative Negative mg/dL    Urobilinogen Urine Normal Normal    Nitrite Urine Negative Negative    Leukocyte Esterase Urine Negative Negative    WBC Urine 1-5 0 - 5 /HPF    RBC Urine None Seen 0 - 2 /HPF    Bacteria Urine 1+ (A) None Seen /HPF    Squamous Epi. Cells Few (A) None Seen /HPF    Renal Tubular Epithelial Cells None Seen None Seen /HPF    Transitional Cells None Seen None Seen /HPF    Yeast Urine None Seen None Seen /HPF   FETAL FIBRONECTIN    Collection Time: 02/05/24  3:41 AM   Result Value Ref Range    Fetal Fibrinectin Negative Negative   ABORH (Blood Type)    Collection Time: 02/05/24  4:54 AM   Result Value Ref Range    ABO BLOOD TYPE B     RH BLOOD TYPE Positive    Antibody Screen    Collection Time: 02/05/24  4:54 AM   Result Value Ref Range    Antibody Screen Negative    CBC W/ DIFFERENTIAL    Collection Time: 02/05/24  4:55 AM   Result Value Ref Range    WBC 8.1 4.0 - 11.0 x10(3) uL    RBC 3.48 (L) 3.80 - 5.30 x10(6)uL    HGB 11.1 (L) 12.0 - 16.0 g/dL    HCT 33.4 (L) 35.0 - 48.0 %    MCV 96.0 80.0 - 100.0 fL    MCH 31.9 26.0 - 34.0 pg    MCHC 33.2 31.0 - 37.0 g/dL    RDW-SD 42.3 35.1 - 46.3 fL    RDW 12.2 11.0 - 15.0 %    .0 (L) 150.0 - 450.0 10(3)uL    Neutrophil Absolute Prelim 6.44 1.50 - 7.70 x10 (3) uL    Neutrophil Absolute 6.44 1.50 - 7.70 x10(3) uL    Lymphocyte Absolute 1.30 1.00 - 4.00 x10(3) uL    Monocyte Absolute 0.22 0.10 - 1.00 x10(3) uL    Eosinophil Absolute 0.05 0.00 - 0.70 x10(3) uL    Basophil Absolute 0.01 0.00 - 0.20 x10(3) uL    Immature Granulocyte Absolute 0.05 0.00 - 1.00 x10(3) uL    Neutrophil % 79.9 %    Lymphocyte % 16.1 %    Monocyte % 2.7 %    Eosinophil % 0.6 %     Basophil % 0.1 %    Immature Granulocyte % 0.6 %     No results for input(s): \"ABORH\" in the last 72 hours.  Recent Labs     02/05/24  0455   WBC 8.1   HGB 11.1*   HCT 33.4*     No results found.    Complications: None    Consults: Yes   Consultants         Provider   Role Specialty     Chris Nelson MD  Consulting Physician Maternal Fetal Medicine            Surgeries:     Pending Labs:   Pending Labs       Order Current Status    Urine Culture, Routine In process            Discharge Plan:   Discharge Condition: Good    Discharge Meds:   Current Discharge Medication List        New Orders    Details   NIFEdipine 10 MG Oral Cap Take 1 capsule (10 mg total) by mouth every 6 (six) hours.           Home Meds - Unchanged    Details   progesterone 100 MG Oral Cap Take 1 capsule (100 mg total) by mouth nightly.      Prenatal MV-Min-Fe Fum-FA-DHA (PRENATAL 1 OR) Take by mouth.                   Discharge Diet: General diet    Discharge Activity:   No heavy lifting >25-30 lbs  Activity as tolerated.        Follow up:      Follow-up Information       Lissett Hobbs MD Follow up.    Specialty: OBSTETRICS & GYNECOLOGY  Why: Keep your OB appt that is already scheduled for tomorrow.  Contact information:  [address]  [address]  [place] [facility] [address] [phone]                                     Lissett Hobbs MD  2/6/2024

## 2024-03-04 LAB — STREP GP B CULT OB: NEGATIVE

## 2024-03-20 ENCOUNTER — TELEPHONE (OUTPATIENT)
Dept: OBGYN UNIT | Facility: HOSPITAL | Age: 29
End: 2024-03-20

## 2024-03-20 RX ORDER — MELATONIN
325
COMMUNITY
End: 2024-03-22

## 2024-03-21 ENCOUNTER — HOSPITAL ENCOUNTER (INPATIENT)
Facility: HOSPITAL | Age: 29
LOS: 1 days | Discharge: HOME | End: 2024-03-22
Attending: OBSTETRICS & GYNECOLOGY | Admitting: OBSTETRICS & GYNECOLOGY
Payer: COMMERCIAL

## 2024-03-21 ENCOUNTER — ANESTHESIA EVENT (OUTPATIENT)
Dept: OBGYN UNIT | Facility: HOSPITAL | Age: 29
End: 2024-03-21
Payer: COMMERCIAL

## 2024-03-21 ENCOUNTER — ANESTHESIA (OUTPATIENT)
Dept: OBGYN UNIT | Facility: HOSPITAL | Age: 29
End: 2024-03-21
Payer: COMMERCIAL

## 2024-03-21 ENCOUNTER — APPOINTMENT (OUTPATIENT)
Dept: OBGYN CLINIC | Facility: HOSPITAL | Age: 29
End: 2024-03-21
Attending: OBSTETRICS & GYNECOLOGY
Payer: COMMERCIAL

## 2024-03-21 PROBLEM — Z34.90 PREGNANT (HCC): Status: ACTIVE | Noted: 2024-03-21

## 2024-03-21 LAB
ANTIBODY SCREEN: NEGATIVE
BASOPHILS # BLD AUTO: 0.04 X10(3) UL (ref 0–0.2)
BASOPHILS NFR BLD AUTO: 0.4 %
DEPRECATED RDW RBC AUTO: 42.5 FL (ref 35.1–46.3)
EOSINOPHIL # BLD AUTO: 0.08 X10(3) UL (ref 0–0.7)
EOSINOPHIL NFR BLD AUTO: 0.8 %
ERYTHROCYTE [DISTWIDTH] IN BLOOD BY AUTOMATED COUNT: 12.5 % (ref 11–15)
HCT VFR BLD AUTO: 37 %
HGB BLD-MCNC: 12.6 G/DL
IMM GRANULOCYTES # BLD AUTO: 0.04 X10(3) UL (ref 0–1)
IMM GRANULOCYTES NFR BLD: 0.4 %
LYMPHOCYTES # BLD AUTO: 2.91 X10(3) UL (ref 1–4)
LYMPHOCYTES NFR BLD AUTO: 27.9 %
MCH RBC QN AUTO: 31.5 PG (ref 26–34)
MCHC RBC AUTO-ENTMCNC: 34.1 G/DL (ref 31–37)
MCV RBC AUTO: 92.5 FL
MONOCYTES # BLD AUTO: 0.64 X10(3) UL (ref 0.1–1)
MONOCYTES NFR BLD AUTO: 6.1 %
NEUTROPHILS # BLD AUTO: 6.73 X10 (3) UL (ref 1.5–7.7)
NEUTROPHILS # BLD AUTO: 6.73 X10(3) UL (ref 1.5–7.7)
NEUTROPHILS NFR BLD AUTO: 64.4 %
PLATELET # BLD AUTO: 169 10(3)UL (ref 150–450)
RBC # BLD AUTO: 4 X10(6)UL
RH BLOOD TYPE: POSITIVE
WBC # BLD AUTO: 10.4 X10(3) UL (ref 4–11)

## 2024-03-21 PROCEDURE — 0KQM0ZZ REPAIR PERINEUM MUSCLE, OPEN APPROACH: ICD-10-PCS | Performed by: OBSTETRICS & GYNECOLOGY

## 2024-03-21 PROCEDURE — 85025 COMPLETE CBC W/AUTO DIFF WBC: CPT | Performed by: OBSTETRICS & GYNECOLOGY

## 2024-03-21 PROCEDURE — 86901 BLOOD TYPING SEROLOGIC RH(D): CPT | Performed by: OBSTETRICS & GYNECOLOGY

## 2024-03-21 PROCEDURE — 86900 BLOOD TYPING SEROLOGIC ABO: CPT | Performed by: OBSTETRICS & GYNECOLOGY

## 2024-03-21 PROCEDURE — 3E033VJ INTRODUCTION OF OTHER HORMONE INTO PERIPHERAL VEIN, PERCUTANEOUS APPROACH: ICD-10-PCS | Performed by: OBSTETRICS & GYNECOLOGY

## 2024-03-21 PROCEDURE — 86850 RBC ANTIBODY SCREEN: CPT | Performed by: OBSTETRICS & GYNECOLOGY

## 2024-03-21 RX ORDER — TERBUTALINE SULFATE 1 MG/ML
0.25 INJECTION, SOLUTION SUBCUTANEOUS AS NEEDED
Status: DISCONTINUED | OUTPATIENT
Start: 2024-03-21 | End: 2024-03-21 | Stop reason: HOSPADM

## 2024-03-21 RX ORDER — LIDOCAINE HYDROCHLORIDE 10 MG/ML
30 INJECTION, SOLUTION EPIDURAL; INFILTRATION; INTRACAUDAL; PERINEURAL ONCE
Status: COMPLETED | OUTPATIENT
Start: 2024-03-21 | End: 2024-03-21

## 2024-03-21 RX ORDER — ONDANSETRON 2 MG/ML
4 INJECTION INTRAMUSCULAR; INTRAVENOUS EVERY 6 HOURS PRN
Status: DISCONTINUED | OUTPATIENT
Start: 2024-03-21 | End: 2024-03-22

## 2024-03-21 RX ORDER — CALCIUM CARBONATE 500 MG/1
1000 TABLET, CHEWABLE ORAL EVERY 4 HOURS PRN
Status: DISCONTINUED | OUTPATIENT
Start: 2024-03-21 | End: 2024-03-21 | Stop reason: HOSPADM

## 2024-03-21 RX ORDER — ACETAMINOPHEN 500 MG
1000 TABLET ORAL EVERY 6 HOURS PRN
Status: DISCONTINUED | OUTPATIENT
Start: 2024-03-21 | End: 2024-03-21 | Stop reason: HOSPADM

## 2024-03-21 RX ORDER — SIMETHICONE 80 MG
80 TABLET,CHEWABLE ORAL 3 TIMES DAILY PRN
Status: DISCONTINUED | OUTPATIENT
Start: 2024-03-21 | End: 2024-03-22

## 2024-03-21 RX ORDER — IBUPROFEN 600 MG/1
600 TABLET ORAL ONCE AS NEEDED
Status: COMPLETED | OUTPATIENT
Start: 2024-03-21 | End: 2024-03-21

## 2024-03-21 RX ORDER — NALBUPHINE HYDROCHLORIDE 10 MG/ML
2.5 INJECTION, SOLUTION INTRAMUSCULAR; INTRAVENOUS; SUBCUTANEOUS
Status: DISCONTINUED | OUTPATIENT
Start: 2024-03-21 | End: 2024-03-22

## 2024-03-21 RX ORDER — IBUPROFEN 600 MG/1
600 TABLET ORAL EVERY 6 HOURS
Status: DISCONTINUED | OUTPATIENT
Start: 2024-03-21 | End: 2024-03-22

## 2024-03-21 RX ORDER — BENZOCAINE/MENTHOL 6 MG-10 MG
1 LOZENGE MUCOUS MEMBRANE EVERY 6 HOURS PRN
Status: DISCONTINUED | OUTPATIENT
Start: 2024-03-21 | End: 2024-03-22

## 2024-03-21 RX ORDER — ACETAMINOPHEN 500 MG
500 TABLET ORAL EVERY 6 HOURS PRN
Status: DISCONTINUED | OUTPATIENT
Start: 2024-03-21 | End: 2024-03-21 | Stop reason: HOSPADM

## 2024-03-21 RX ORDER — BUPIVACAINE HYDROCHLORIDE 2.5 MG/ML
20 INJECTION, SOLUTION EPIDURAL; INFILTRATION; INTRACAUDAL ONCE
Status: DISCONTINUED | OUTPATIENT
Start: 2024-03-21 | End: 2024-03-21 | Stop reason: HOSPADM

## 2024-03-21 RX ORDER — AMMONIA INHALANTS 0.04 G/.3ML
0.3 INHALANT RESPIRATORY (INHALATION) AS NEEDED
Status: DISCONTINUED | OUTPATIENT
Start: 2024-03-21 | End: 2024-03-22

## 2024-03-21 RX ORDER — SODIUM CHLORIDE, SODIUM LACTATE, POTASSIUM CHLORIDE, CALCIUM CHLORIDE 600; 310; 30; 20 MG/100ML; MG/100ML; MG/100ML; MG/100ML
INJECTION, SOLUTION INTRAVENOUS CONTINUOUS
Status: DISCONTINUED | OUTPATIENT
Start: 2024-03-21 | End: 2024-03-21 | Stop reason: HOSPADM

## 2024-03-21 RX ORDER — DEXTROSE, SODIUM CHLORIDE, SODIUM LACTATE, POTASSIUM CHLORIDE, AND CALCIUM CHLORIDE 5; .6; .31; .03; .02 G/100ML; G/100ML; G/100ML; G/100ML; G/100ML
INJECTION, SOLUTION INTRAVENOUS AS NEEDED
Status: DISCONTINUED | OUTPATIENT
Start: 2024-03-21 | End: 2024-03-21 | Stop reason: HOSPADM

## 2024-03-21 RX ORDER — ACETAMINOPHEN 500 MG
500 TABLET ORAL EVERY 6 HOURS PRN
Status: DISCONTINUED | OUTPATIENT
Start: 2024-03-21 | End: 2024-03-22

## 2024-03-21 RX ORDER — DOCUSATE SODIUM 100 MG/1
100 CAPSULE, LIQUID FILLED ORAL 2 TIMES DAILY
Status: DISCONTINUED | OUTPATIENT
Start: 2024-03-21 | End: 2024-03-22

## 2024-03-21 RX ORDER — BUPIVACAINE HCL/0.9 % NACL/PF 0.25 %
5 PLASTIC BAG, INJECTION (ML) EPIDURAL AS NEEDED
Status: DISCONTINUED | OUTPATIENT
Start: 2024-03-21 | End: 2024-03-22

## 2024-03-21 RX ORDER — BISACODYL 10 MG
10 SUPPOSITORY, RECTAL RECTAL ONCE AS NEEDED
Status: DISCONTINUED | OUTPATIENT
Start: 2024-03-21 | End: 2024-03-22

## 2024-03-21 RX ORDER — ACETAMINOPHEN 500 MG
1000 TABLET ORAL EVERY 6 HOURS PRN
Status: DISCONTINUED | OUTPATIENT
Start: 2024-03-21 | End: 2024-03-22

## 2024-03-21 RX ORDER — CITRIC ACID/SODIUM CITRATE 334-500MG
30 SOLUTION, ORAL ORAL AS NEEDED
Status: DISCONTINUED | OUTPATIENT
Start: 2024-03-21 | End: 2024-03-21 | Stop reason: HOSPADM

## 2024-03-21 RX ORDER — ONDANSETRON 2 MG/ML
4 INJECTION INTRAMUSCULAR; INTRAVENOUS EVERY 6 HOURS PRN
Status: DISCONTINUED | OUTPATIENT
Start: 2024-03-21 | End: 2024-03-21 | Stop reason: HOSPADM

## 2024-03-21 NOTE — PLAN OF CARE
Problem: BIRTH - VAGINAL/ SECTION  Goal: Fetal and maternal status remain reassuring during the birth process  Description: INTERVENTIONS:  - Monitor vital signs  - Monitor fetal heart rate  - Monitor uterine activity  - Monitor labor progression (vaginal delivery)  - DVT prophylaxis (C/S delivery)  - Surgical antibiotic prophylaxis (C/S delivery)  Outcome: Progressing     Problem: PAIN - ADULT  Goal: Verbalizes/displays adequate comfort level or patient's stated pain goal  Description: INTERVENTIONS:  - Encourage pt to monitor pain and request assistance  - Assess pain using appropriate pain scale  - Administer analgesics based on type and severity of pain and evaluate response  - Implement non-pharmacological measures as appropriate and evaluate response  - Consider cultural and social influences on pain and pain management  - Manage/alleviate anxiety  - Utilize distraction and/or relaxation techniques  - Monitor for opioid side effects  - Notify MD/LIP if interventions unsuccessful or patient reports new pain  - Anticipate increased pain with activity and pre-medicate as appropriate  Outcome: Progressing     Problem: ANXIETY  Goal: Will report anxiety at manageable levels  Description: INTERVENTIONS:  - Administer medication as ordered  - Teach and rehearse alternative coping skills  - Provide emotional support with 1:1 interaction with staff  Outcome: Progressing     Problem: Patient Centered Care  Goal: Patient preferences are identified and integrated in the patient's plan of care  Description: Interventions:  - What would you like us to know as we care for you?   - Provide timely, complete, and accurate information to patient/family  - Incorporate patient and family knowledge, values, beliefs, and cultural backgrounds into the planning and delivery of care  - Encourage patient/family to participate in care and decision-making at the level they choose  - Honor patient and family perspectives and  choices  Outcome: Progressing     Problem: Patient/Family Goals  Goal: Patient/Family Long Term Goal  Description: Patient's Long Term Goal:     Interventions:  -   - See additional Care Plan goals for specific interventions  Outcome: Progressing  Goal: Patient/Family Short Term Goal  Description: Patient's Short Term Goal:     Interventions:   -   - See additional Care Plan goals for specific interventions  Outcome: Progressing     Problem: POSTPARTUM  Goal: Long Term Goal:Experiences normal postpartum course  Description: INTERVENTIONS:  - Assess and monitor vital signs and lab values.  - Assess fundus and lochia.  - Provide ice/sitz baths for perineum discomfort.  - Monitor healing of incision/episiotomy/laceration, and assess for signs and symptoms of infection and hematoma.  - Assess bladder function and monitor for bladder distention.  - Provide/instruct/assist with pericare as needed.  - Provide VTE prophylaxis as needed.  - Monitor bowel function.  - Encourage ambulation and provide assistance as needed.  - Assess and monitor emotional status and provide social service/psych resources as needed.  - Utilize standard precautions and use personal protective equipment as indicated. Ensure aseptic care of all intravenous lines and invasive tubes/drains.  - Obtain immunization and exposure to communicable diseases history.  Outcome: Progressing  Goal: Optimize infant feeding at the breast  Description: INTERVENTIONS:  - Initiate breast feeding within first hour after birth.   - Monitor effectiveness of current breast feeding efforts.  - Assess support systems available to mother/family.  - Identify cultural beliefs/practices regarding lactation, letdown techniques, maternal food preferences.  - Assess mother's knowledge and previous experience with breast feeding.  - Provide information as needed about early infant feeding cues (e.g., rooting, lip smacking, sucking fingers/hand) versus late cue of crying.  -  Discuss/demonstrate breast feeding aids (e.g., infant sling, nursing footstool/pillows, and breast pumps).  - Encourage mother/other family members to express feelings/concerns, and actively listen.  - Educate father/SO about benefits of breast feeding and how to manage common lactation challenges.  - Recommend avoidance of specific medications or substances incompatible with breast feeding.  - Assess and monitor for signs of nipple pain/trauma.  - Instruct and provide assistance with proper latch.  - Review techniques for milk expression (breast pumping) and storage of breast milk. Provide pumping equipment/supplies, instructions and assistance, as needed.  - Encourage rooming-in and breast feeding on demand.  - Encourage skin-to-skin contact.  - Provide LC support as needed.  - Assess for and manage engorgement.  - Provide breast feeding education handouts and information on community breast feeding support.   Outcome: Progressing  Goal: Establishment of adequate milk supply with medication/procedure interruptions  Description: INTERVENTIONS:  - Review techniques for milk expression (breast pumping).   - Provide pumping equipment/supplies, instructions, and assistance until it is safe to breastfeed infant.  Outcome: Progressing  Goal: Experiences normal breast weaning course  Description: INTERVENTIONS:  - Assess for and manage engorgement.  - Instruct on breast care.  - Provide comfort measures.  Outcome: Progressing  Goal: Appropriate maternal -  bonding  Description: INTERVENTIONS:  - Assess caregiver- interactions.  - Assess caregiver's emotional status and coping mechanisms.  - Encourage caregiver to participate in  daily care.  - Assess support systems available to mother/family.  - Provide /case management support as needed.  Outcome: Progressing

## 2024-03-21 NOTE — PROGRESS NOTES
Received patient into room 366 via wheelhair . Bedside shift report received from SATURNINO Pichardo. Pt transferred to bed. Bed in lowest and locked position. Side rails up x2. VSS. IV site unremarkable. Baby present in open crib.  ID bands matched with L&D RN.  Patient and family oriented to unit, room and call light within reach.  Safety measures in place, POC followed. Plan of care ongoing.    Advised to call for assistance to bathroom.

## 2024-03-21 NOTE — PLAN OF CARE
Problem: BIRTH - VAGINAL/ SECTION  Goal: Fetal and maternal status remain reassuring during the birth process  Description: INTERVENTIONS:  - Monitor vital signs  - Monitor fetal heart rate  - Monitor uterine activity  - Monitor labor progression (vaginal delivery)  - DVT prophylaxis (C/S delivery)  - Surgical antibiotic prophylaxis (C/S delivery)  Outcome: Progressing     Problem: PAIN - ADULT  Goal: Verbalizes/displays adequate comfort level or patient's stated pain goal  Description: INTERVENTIONS:  - Encourage pt to monitor pain and request assistance  - Assess pain using appropriate pain scale  - Administer analgesics based on type and severity of pain and evaluate response  - Implement non-pharmacological measures as appropriate and evaluate response  - Consider cultural and social influences on pain and pain management  - Manage/alleviate anxiety  - Utilize distraction and/or relaxation techniques  - Monitor for opioid side effects  - Notify MD/LIP if interventions unsuccessful or patient reports new pain  - Anticipate increased pain with activity and pre-medicate as appropriate  Outcome: Progressing     Problem: ANXIETY  Goal: Will report anxiety at manageable levels  Description: INTERVENTIONS:  - Administer medication as ordered  - Teach and rehearse alternative coping skills  - Provide emotional support with 1:1 interaction with staff  Outcome: Progressing     Problem: Patient Centered Care  Goal: Patient preferences are identified and integrated in the patient's plan of care  Description: Interventions:  - What would you like us to know as we care for you? I am having a baby girl  - Provide timely, complete, and accurate information to patient/family  - Incorporate patient and family knowledge, values, beliefs, and cultural backgrounds into the planning and delivery of care  - Encourage patient/family to participate in care and decision-making at the level they choose  - [place] patient and  family perspectives and choices  Outcome: Progressing     Problem: Patient/Family Goals  Goal: Patient/Family Long Term Goal  Description: Patient's Long Term Goal: Patient's Long Term Goal: Uncomplicated Delivery     Interventions:  - Assessment/Monitoring  - Induction/Augmentation per protocol and Provider order  - C/S per protocol and Provider order   - Education  - Intervention per protocol and Provider order with education   - Involve patient in POC  - See additional Care Plan goals for specific interventions  - See additional Care Plan goals for specific interventions  Outcome: Progressing  Goal: Patient/Family Short Term Goal  Description: Patient's Short Term Goal: Patient's Short Term Goal: Comfort and Pain Control     Interventions:   - Non Pharmacological pain intervention   - IV/IM and Epidural pain medication per Provider order and patient request  - Education  - Involve Patient in POC   - See additional Care Plan goals for specific interventions  - See additional Care Plan goals for specific interventions  Outcome: Progressing

## 2024-03-21 NOTE — L&D DELIVERY NOTE
Juana, Pending [[id number]]      Labor Events     labor?: No   steroids?: Full Course  Antibiotics received during labor?: No  Labor type: Induced Onset of Labor  Induction: Oxytocin  Indications for induction: Elective  Intrapartum & labor complications: None       Labor Event Times    Dilation complete date/time: 3/21/2024 1200       [place] Presentation    Presentation: Vertex  Position: Left Occiput Anterior       Operative Delivery    Operative Vaginal Delivery: No                Shoulder Dystocia    Shoulder Dystocia: No       Anesthesia    Method: Epidural               Delivery      Head delivery date/time: 3/21/2024 12:04:04   Delivery date/time:  3/21/24 12:04:20   Delivery type: Normal spontaneous vaginal delivery    Details:     Delivery location: delivery room  Delivery Room Temperature: 72       Delivery Providers    Delivering Clinician: Paco Field MD   Delivery personnel:  Provider Role   Sweetie Spann, RN Baby Nurse   Kylee Farrar, RN Delivery Nurse   Tammi Haque Surgical Tech             Cord    Vessels: 3 Vessels  Complications: None  # of loops: 0  Timed cord clamping: Yes  Time in sec: 60  Cord blood disposition: to lab  Gases sent?: No       [place] Measurements      Weight: 3110 g 6 lb 13.7 oz                Placenta    Date/time: 3/21/2024 1206  Removal: Spontaneous  Appearance: Intact  Disposition: Discarded       Apgars    Living status: Living   Apgar Scoring Key:    0 1 2    Skin color Blue or pale Acrocyanotic Completely pink    Heart rate Absent <100 bpm >100 bpm    Reflex irritability No response [place] Cry or active withdrawal    Muscle tone Limp Some flexion Active motion    Respiratory effort Absent Weak cry; hypoventilation Good, crying              1 Minute:  5 Minute:  10 Minute:  15 Minute:  20 Minute:      Skin color: 0  0       Heart rate: 2  2       Reflex irritablity: 2  2       Muscle tone: 2  2       Respiratory  effort: 2  2       Total: 8  8          Apgars assigned by: LEANDRO MATAMOROS  [place] disposition:  nursery       Skin to Skin    Skin to skin initiated date/time: 3/21/2024 1204  Skin to skin with: Mother       Vaginal Count    Initial count RN: Kylee Farrar RN  Initial count Tech: Shabnam, Tammi   Sponges   Shar[place]    Initial counts 10   0    Final counts 10       Final count RN: Kylee Farrar RN  Final count MD: Paco Field MD       Lacerations    Episiotomy: None  Perineal lacerations: 2nd Repaired?: Yes     Vaginal laceration?: No      Cervical laceration?: No    Clitoral laceration?: No    Quantitative blood loss (mL): 50                [facility]  [facility] of [facility]    Vaginal Delivery Note    Montse Arias Patient Status:  Inpatient    1995 MRN [id number]   Location [facility] Attending Raven Hall MD   Hosp Day # 0 PCP [place], DO     Delivery     Infant  Date of Delivery: 3/21/2024    Time of Delivery: 12:04 PM   Delivery Type: Normal spontaneous vaginal delivery     Infant Sex/Birthweight: child 6 lb 13.7 oz (3.11 kg)     Presentation Vertex [1]   Position Left [1]  Occiput [1]  Anterior [1]     Apgars:  1 minute: 8                5 minutes: 8                         10 minutes:      Placenta  Date/Time of Delivery: 3/21/2024 12:06 PM    Delivery: spontaneous  Placenta to Pathology: no  Cord Gases Submitted: no  Cord Blood Collection: yes  Cord Tissue Collection: no  Cord Complications: none  Sponge and Needle Counts:  Verified yes    Maternal Anesthesia: local   Episiotomy/Laceration Repair  Laceration: perineal second degree    Delivery Complications  none    Neonatologist Present: no  Delivery Comment:  female anus and rectum intact after repair stitch not felt    Intake/Output   EBL:  200 ml      Paco Fiedl MD   3/21/2024  12:18 PM

## 2024-03-21 NOTE — PROGRESS NOTES
Transferred Mother to room    366   via wheelchair accompanied by S.O., in stable condition. Report given to    Sandra MATAMOROS. Bed in locked and low position, side rails up x 2, ID's checked and verified, call light with in reach, reinforced pt to call for assistance when needs  to go to the bathroom.

## 2024-03-21 NOTE — ANESTHESIA PREPROCEDURE EVALUATION
Anesthesia PreOp Note    HPI:     Montse Arias is a 28 year old female who presents for preoperative consultation requested by: * No surgeons listed *    Date of Surgery: 3/21/2024    * No procedures listed *  Indication: * No pre-op diagnosis entered *    Relevant Problems   No relevant active problems       NPO:                         History Review:  Patient Active Problem List    Diagnosis Date Noted    Pregnant (Formerly [facility]) 03/21/2024    Pregnancy (Formerly [facility]) 02/05/2024    COVID-19 affecting pregnancy in second trimester (Formerly [facility]) 12/21/2021    Supervision of normal first pregnancy, antepartum ([facility]) 12/15/2021       Past Medical History:   Diagnosis Date    COVID-19 virus infection 12/21/2021       Past Surgical History:   Procedure Laterality Date    OTHER      mucocele removal x 3       Medications Prior to Admission   Medication Sig Dispense Refill Last Dose    ferrous sulfate 325 (65 FE) MG Oral Tab EC Take 1 tablet (325 mg total) by mouth daily with breakfast.   3/20/2024    Prenatal MV-Min-Fe Fum-FA-DHA (PRENATAL 1 OR) Take by mouth.   3/21/2024    NIFEdipine 10 MG Oral Cap Take 1 capsule (10 mg total) by mouth every 6 (six) hours. 112 capsule 0     progesterone 100 MG Oral Cap Take 1 capsule (100 mg total) by mouth nightly.        Current Facility-Administered Medications Ordered in Epic   Medication Dose Route Frequency Provider Last Rate Last Admin    acetaminophen (Tylenol Extra Strength) tab 500 mg  500 mg Oral Q6H PRN Raven Hall MD        acetaminophen (Tylenol Extra Strength) tab 1,000 mg  1,000 mg Oral Q6H PRN Raven Hall MD        ibuprofen (Motrin) tab 600 mg  600 mg Oral Once PRN Raven Hall MD        ondansetron (Zofran) 4 MG/2ML injection 4 mg  4 mg Intravenous Q6H PRN Raven Hall MD        oxyTOCIN in sodium chloride 0.9% (Pitocin) 30 Units/500mL infusion premix  62.5-900 isaura-units/min Intravenous Continuous Raven Hall MD        terbutaline (Brethine) 1 MG/ML injection 0.25 mg  0.25 mg Subcutaneous  PRN Raven Hall MD        sodium citrate-citric acid (Bicitra) 500-334 MG/5ML oral solution 30 mL  30 mL Oral PRN Raven Hall MD        lidocaine PF (Xylocaine-MPF) 1% injection  30 mL Intradermal Once Raven Hall MD        lactated ringers infusion   Intravenous Continuous Raven Hall  mL/hr at 03/21/24 0600 New Bag at 03/21/24 0600    dextrose in lactated ringers 5% infusion   Intravenous PRN Raven Hall MD        lactated ringers IV bolus 500 mL  500 mL Intravenous PRN Raven Hall MD        calcium carbonate (Tums) chewable tab 1,000 mg  1,000 mg Oral Q4H PRN Raven Hall MD        fentaNYL (Sublimaze) 50 mcg/mL injection 100 mcg  100 mcg Intravenous Once PRN Raven Hall MD        Followed by    [START ON 3/22/2024] fentaNYL (Sublimaze) 50 mcg/mL injection 50 mcg  50 mcg Intravenous Q30 Min PRN Raven Hall MD        oxyTOCIN in sodium chloride 0.9% (Pitocin) 30 Units/500mL infusion premix  0.5-20 isaura-units/min Intravenous Continuous Raven Hall MD 15 mL/hr at 03/21/24 1130 15 isaura-units/min at 03/21/24 1130    fentaNYL-bupivacaine 2 mcg/mL-0.125% in sodium chloride 0.9% 100 mL EPIDURAL infusion premix   Epidural Continuous Kaitlin Coello MD        fentaNYL (Sublimaze) 50 mcg/mL injection 100 mcg  100 mcg Epidural Once Kaitlin Coello MD        bupivacaine PF (Marcaine) 0.25% injection  20 mL Epidural Once Kaitlin Coello MD        EPHEDrine (PF) 25 MG/5 ML injection 5 mg  5 mg Intravenous PRN Kaitlin Coello MD        nalbuphine (Nubain) 10 mg/mL injection 2.5 mg  2.5 mg Intravenous Q15 Min PRN Kaitlin Coello MD         No current Epic-ordered outpatient medications on file.       No Known Allergies    Family History   Problem Relation Age of Onset    Hypertension Mother      Social History     Socioeconomic History    Marital status: Single   Tobacco Use    Smoking status: Never    Smokeless tobacco: Never   Vaping Use    Vaping Use: Never used   Substance and Sexual Activity    Alcohol use: Not Currently    Drug use:  Never    Sexual activity: Yes     Partners: Male       Available pre-op labs reviewed.  Lab Results   Component Value Date    WBC 10.4 03/21/2024    RBC 4.00 03/21/2024    HGB 12.6 03/21/2024    HGB 11.2 01/08/2024    HCT 37.0 03/21/2024    HCT 33.2 01/08/2024    MCV 92.5 03/21/2024    MCH 31.5 03/21/2024    MCHC 34.1 03/21/2024    RDW 12.5 03/21/2024    .0 03/21/2024     01/08/2024             Vital Signs:  Body mass index is 28.08 kg/m².   height is 1.676 m (5' 6\") and weight is 78.9 kg (174 lb). Her oral temperature is 97.4 °F (36.3 °C). Her blood pressure is 111/62 and her pulse is 70. Her respiration is 14.   Vitals:    03/21/24 0515 03/21/24 0930   BP: 93/60 111/62   Pulse: 97 70   Resp: 16 14   Temp: 98.1 °F (36.7 °C) 97.4 °F (36.3 °C)   TempSrc: Oral Oral   Weight: 78.9 kg (174 lb)    Height: 1.676 m (5' 6\")         Anesthesia Evaluation     Patient summary reviewed and Nursing notes reviewed    No history of anesthetic complications   Airway   Mallampati: II  TM distance: <3 FB  Neck ROM: full  Dental - Dentition appears grossly intact     Pulmonary - negative ROS and normal exam   Cardiovascular - negative ROS and normal exam  Exercise tolerance: good    Neuro/Psych - negative ROS     GI/Hepatic/Renal - negative ROS     Endo/Other - negative ROS   Abdominal                  Anesthesia Plan:   ASA:  2  Emergent    Plan:   Epidural  Post-op Pain Management: Continuous epidural  Plan Comments: Neuraxial anesthesia was explained in detail to the patient, addressing the technique, intended outcome and known adverse events namely spinal or epidural bleeding, infection, post dural puncture headaches, complete spinal block with resulting cardiovascular and respiratory failure, block failure and or need for multiple attempts or switching to general anesthesia.   Informed Consent Plan and Risks Discussed With:  Patient      I have informed Montse Arias and/or legal guardian or family member of  the nature of the anesthetic plan, benefits, risks including possible dental damage if relevant, major complications, and any alternative forms of anesthetic management.   All of the patient's questions were answered to the best of my ability. The patient desires the anesthetic management as planned.  Kaitlin Coello MD  3/21/2024 11:59 AM  Present on Admission:  **None**

## 2024-03-21 NOTE — PLAN OF CARE
Problem: BIRTH - VAGINAL/ SECTION  Goal: Fetal and maternal status remain reassuring during the birth process  Description: INTERVENTIONS:  - Monitor vital signs  - Monitor fetal heart rate  - Monitor uterine activity  - Monitor labor progression (vaginal delivery)  - DVT prophylaxis (C/S delivery)  - Surgical antibiotic prophylaxis (C/S delivery)  Outcome: Progressing     Problem: PAIN - ADULT  Goal: Verbalizes/displays adequate comfort level or patient's stated pain goal  Description: INTERVENTIONS:  - Encourage pt to monitor pain and request assistance  - Assess pain using appropriate pain scale  - Administer analgesics based on type and severity of pain and evaluate response  - Implement non-pharmacological measures as appropriate and evaluate response  - Consider cultural and social influences on pain and pain management  - Manage/alleviate anxiety  - Utilize distraction and/or relaxation techniques  - Monitor for opioid side effects  - Notify MD/LIP if interventions unsuccessful or patient reports new pain  - Anticipate increased pain with activity and pre-medicate as appropriate  Outcome: Progressing     Problem: ANXIETY  Goal: Will report anxiety at manageable levels  Description: INTERVENTIONS:  - Administer medication as ordered  - Teach and rehearse alternative coping skills  - Provide emotional support with 1:1 interaction with staff  Outcome: Progressing     Problem: Patient Centered Care  Goal: Patient preferences are identified and integrated in the patient's plan of care  Description: Interventions:  - What would you like us to know as we care for you? Cord blood collection kit   - Provide timely, complete, and accurate information to patient/family  - Incorporate patient and family knowledge, values, beliefs, and cultural backgrounds into the planning and delivery of care  - Encourage patient/family to participate in care and decision-making at the level they choose  - [facility] patient and  family perspectives and choices  Outcome: Progressing     Problem: Patient/Family Goals  Goal: Patient/Family Long Term Goal  Description: Patient's Long Term Goal: uncomplicated vaginal delivery    Interventions:  - See additional Care Plan goals for specific interventions  Outcome: Progressing  Goal: Patient/Family Short Term Goal  Description: Patient's Short Term Goal: pain control    Interventions:   - epidural  - See additional Care Plan goals for specific interventions  Outcome: Progressing

## 2024-03-21 NOTE — PROGRESS NOTES
Pt is a 28 year old female admitted to [facility].     Chief Complaint   Patient presents with    Scheduled Induction     Elective        Pt is  39w2d intra-uterine pregnancy.  History obtained, consents signed. Oriented to room, staff, and plan of care.

## 2024-03-22 VITALS
DIASTOLIC BLOOD PRESSURE: 73 MMHG | SYSTOLIC BLOOD PRESSURE: 112 MMHG | TEMPERATURE: 98 F | WEIGHT: 174 LBS | HEART RATE: 63 BPM | BODY MASS INDEX: 27.97 KG/M2 | HEIGHT: 66 IN | RESPIRATION RATE: 16 BRPM

## 2024-03-22 LAB
BASOPHILS # BLD AUTO: 0.05 X10(3) UL (ref 0–0.2)
BASOPHILS NFR BLD AUTO: 0.5 %
DEPRECATED RDW RBC AUTO: 42.9 FL (ref 35.1–46.3)
EOSINOPHIL # BLD AUTO: 0.09 X10(3) UL (ref 0–0.7)
EOSINOPHIL NFR BLD AUTO: 0.9 %
ERYTHROCYTE [DISTWIDTH] IN BLOOD BY AUTOMATED COUNT: 12.4 % (ref 11–15)
HCT VFR BLD AUTO: 33.4 %
HGB BLD-MCNC: 11.8 G/DL
IMM GRANULOCYTES # BLD AUTO: 0.06 X10(3) UL (ref 0–1)
IMM GRANULOCYTES NFR BLD: 0.6 %
LYMPHOCYTES # BLD AUTO: 2.81 X10(3) UL (ref 1–4)
LYMPHOCYTES NFR BLD AUTO: 28.8 %
MCH RBC QN AUTO: 33.1 PG (ref 26–34)
MCHC RBC AUTO-ENTMCNC: 35.3 G/DL (ref 31–37)
MCV RBC AUTO: 93.6 FL
MONOCYTES # BLD AUTO: 0.77 X10(3) UL (ref 0.1–1)
MONOCYTES NFR BLD AUTO: 7.9 %
NEUTROPHILS # BLD AUTO: 5.98 X10 (3) UL (ref 1.5–7.7)
NEUTROPHILS # BLD AUTO: 5.98 X10(3) UL (ref 1.5–7.7)
NEUTROPHILS NFR BLD AUTO: 61.3 %
PLATELET # BLD AUTO: 151 10(3)UL (ref 150–450)
RBC # BLD AUTO: 3.57 X10(6)UL
WBC # BLD AUTO: 9.8 X10(3) UL (ref 4–11)

## 2024-03-22 PROCEDURE — 85025 COMPLETE CBC W/AUTO DIFF WBC: CPT | Performed by: OBSTETRICS & GYNECOLOGY

## 2024-03-22 RX ORDER — CHOLECALCIFEROL (VITAMIN D3) 25 MCG
1 TABLET,CHEWABLE ORAL DAILY
Status: DISCONTINUED | OUTPATIENT
Start: 2024-03-22 | End: 2024-03-22

## 2024-03-22 RX ORDER — PSEUDOEPHEDRINE HCL 30 MG
100 TABLET ORAL 2 TIMES DAILY PRN
Qty: 10 CAPSULE | Refills: 0 | Status: SHIPPED | OUTPATIENT
Start: 2024-03-22

## 2024-03-22 RX ORDER — CHOLECALCIFEROL (VITAMIN D3) 25 MCG
1 TABLET,CHEWABLE ORAL DAILY
Qty: 30 CAPSULE | Refills: 0 | Status: SHIPPED | OUTPATIENT
Start: 2024-03-23

## 2024-03-22 RX ORDER — IBUPROFEN 600 MG/1
600 TABLET ORAL EVERY 6 HOURS PRN
Qty: 32 TABLET | Refills: 0 | Status: SHIPPED | OUTPATIENT
Start: 2024-03-22

## 2024-03-22 NOTE — DISCHARGE SUMMARY
[facility]  part of [facility]    Obstetrical Discharge Summary    Montse Arias Patient Status:  Inpatient    1995 MRN [id number]   Location [facility] 3SE Attending Raven Hall MD   Hosp Day # 1 PCP [place] DO     Date of Admission: 3/21/2024     Date of Discharge: 3/22/2024    Admitting Diagnosis: Pregnant state, incidental (Formerly [facility]) [Z33.1]    Discharge Diagnosis: .Active Problems:    Pregnant (Formerly [facility])    Vaginal delivery ([facility]ly [facility])      Disposition: Home    Hospital Course:   Reason for Admission:  Montse Arias is a 28 year old  who was admitted with Estimated Date of Delivery: 3/26/24. She presented for labor management.  Pregnancy was complicated by:  Patient Active Problem List    Diagnosis Date Noted    Pregnant (Formerly [facility]) 2024    Vaginal delivery ([facility]) 2024    Pregnancy ([facility]) 2024    COVID-19 affecting pregnancy in second trimester ([facility]) 2021    Supervision of normal first pregnancy, antepartum ([facility]) 12/15/2021       Hospital Course: This is a 28 year old  who was admitted with Estimated Date of Delivery: 3/26/24     Pt underwent a spontaneous vaginal. Refer to delivery note for details.  Pt was transferred to mother/ baby.  She underwent an uncomplicated postpartum course.  Pt desires to be charged today.    Date of Delivery: 3/21/2024    Time of Delivery: 12:04 PM   Delivery Type: Normal spontaneous vaginal delivery     Live   Information for the patient's :  Andmona Girl [[id number]]   female  infant   Information for the patient's :  Juana Girl [[id number]]   6 lb 13.7 oz (3.11 kg)    Apgars:  1 minute: 8                5 minutes: 8                         10 minutes:        Postpartum Course: Her postpartum course was uncomplicated except for asymptomatic anemia.    Discharge Physical Exam:   /69 (BP Location: Right arm)   Pulse 72   Temp 98 °F (36.7 °C) (Oral)   Resp 16   Ht 5' 6\"  (1.676 m)   Wt 174 lb (78.9 kg)   Breastfeeding Yes   BMI 28.08 kg/m²   General appearance:  alert, appears stated age, cooperative and no distress  Abdominal: soft,  no rebound; no guarding; appropriately tender  Uterus: firm, nontender, below umbilicus  Pelvic: deferred  Extremities: Homans sign is negative, no sign of DVT; no c/c/e      Results:   Recent Results (from the past 336 hour(s))   ABORH (Blood Type)    Collection Time: 03/21/24  5:44 AM   Result Value Ref Range    ABO BLOOD TYPE B     RH BLOOD TYPE Positive    Antibody Screen    Collection Time: 03/21/24  5:44 AM   Result Value Ref Range    Antibody Screen Negative    CBC W/ DIFFERENTIAL    Collection Time: 03/21/24  5:44 AM   Result Value Ref Range    WBC 10.4 4.0 - 11.0 x10(3) uL    RBC 4.00 3.80 - 5.30 x10(6)uL    HGB 12.6 12.0 - 16.0 g/dL    HCT 37.0 35.0 - 48.0 %    MCV 92.5 80.0 - 100.0 fL    MCH 31.5 26.0 - 34.0 pg    MCHC 34.1 31.0 - 37.0 g/dL    RDW-SD 42.5 35.1 - 46.3 fL    RDW 12.5 11.0 - 15.0 %    .0 150.0 - 450.0 10(3)uL    Neutrophil Absolute Prelim 6.73 1.50 - 7.70 x10 (3) uL    Neutrophil Absolute 6.73 1.50 - 7.70 x10(3) uL    Lymphocyte Absolute 2.91 1.00 - 4.00 x10(3) uL    Monocyte Absolute 0.64 0.10 - 1.00 x10(3) uL    Eosinophil Absolute 0.08 0.00 - 0.70 x10(3) uL    Basophil Absolute 0.04 0.00 - 0.20 x10(3) uL    Immature Granulocyte Absolute 0.04 0.00 - 1.00 x10(3) uL    Neutrophil % 64.4 %    Lymphocyte % 27.9 %    Monocyte % 6.1 %    Eosinophil % 0.8 %    Basophil % 0.4 %    Immature Granulocyte % 0.4 %   CBC W/ DIFFERENTIAL    Collection Time: 03/22/24  5:59 AM   Result Value Ref Range    WBC 9.8 4.0 - 11.0 x10(3) uL    RBC 3.57 (L) 3.80 - 5.30 x10(6)uL    HGB 11.8 (L) 12.0 - 16.0 g/dL    HCT 33.4 (L) 35.0 - 48.0 %    MCV 93.6 80.0 - 100.0 fL    MCH 33.1 26.0 - 34.0 pg    MCHC 35.3 31.0 - 37.0 g/dL    RDW-SD 42.9 35.1 - 46.3 fL    RDW 12.4 11.0 - 15.0 %    .0 150.0 - 450.0 10(3)uL    Neutrophil Absolute Prelim  5.98 1.50 - 7.70 x10 (3) uL    Neutrophil Absolute 5.98 1.50 - 7.70 x10(3) uL    Lymphocyte Absolute 2.81 1.00 - 4.00 x10(3) uL    Monocyte Absolute 0.77 0.10 - 1.00 x10(3) uL    Eosinophil Absolute 0.09 0.00 - 0.70 x10(3) uL    Basophil Absolute 0.05 0.00 - 0.20 x10(3) uL    Immature Granulocyte Absolute 0.06 0.00 - 1.00 x10(3) uL    Neutrophil % 61.3 %    Lymphocyte % 28.8 %    Monocyte % 7.9 %    Eosinophil % 0.9 %    Basophil % 0.5 %    Immature Granulocyte % 0.6 %     No results for input(s): \"ABORH\" in the last 72 hours.  Recent Labs     03/21/24  0544 03/22/24  0559   WBC 10.4 9.8   HGB 12.6 11.8*   HCT 37.0 33.4*     No results found.    Complications: None      Discharge Plan:   Discharge Condition: Good    Discharge Meds:   Current Discharge Medication List        New Orders    Details   ibuprofen 600 MG Oral Tab Take 1 tablet (600 mg total) by mouth every 6 (six) hours as needed for Pain (moderate pain).      docusate sodium 100 MG Oral Cap Take 100 mg by mouth 2 (two) times daily as needed for constipation.           Home Meds - Unchanged    Details   Prenatal MV-Min-Fe Fum-FA-DHA (PRENATAL 1 OR) Take by mouth.                   Discharge Diet: As tolerated and General diet    Discharge Activity:   No heavy lifting >25-30 lbs  Abstain from sexual intercourse until cleared at 6 wks PP  No driving the first week and when on opiates  Activity as tolerated.    No exercise until cleared at 6 wks PP    Follow up:      Follow-up Information       Paco Field MD Follow up in 6 week(s).    Specialty: OBSTETRICS & GYNECOLOGY  Why: Call the office to schedule an appointment in 6 weeks for postpartum care.  If you have questions or concerns call the office for possible evaluation sooner.  Contact information:  [address]   [address] [place] [id number] [phone]                                 Lissett Hobbs MD  3/22/2024

## 2024-03-22 NOTE — PLAN OF CARE
Problem: Patient Centered Care  Goal: Patient preferences are identified and integrated in the patient's plan of care  Description: Interventions:  - What would you like us to know as we care for you?   - Provide timely, complete, and accurate information to patient/family  - Incorporate patient and family knowledge, values, beliefs, and cultural backgrounds into the planning and delivery of care  - Encourage patient/family to participate in care and decision-making at the level they choose  - Honor patient and family perspectives and choices  Outcome: Progressing     Problem: Patient/Family Goals  Goal: Patient/Family Long Term Goal  Description: Patient's Long Term Goal:     Interventions:  -   - See additional Care Plan goals for specific interventions  Outcome: Progressing  Goal: Patient/Family Short Term Goal  Description: Patient's Short Term Goal:     Interventions:   -   - See additional Care Plan goals for specific interventions  Outcome: Progressing     Problem: POSTPARTUM  Goal: Long Term Goal:Experiences normal postpartum course  Description: INTERVENTIONS:  - Assess and monitor vital signs and lab values.  - Assess fundus and lochia.  - Provide ice/sitz baths for perineum discomfort.  - Monitor healing of incision/episiotomy/laceration, and assess for signs and symptoms of infection and hematoma.  - Assess bladder function and monitor for bladder distention.  - Provide/instruct/assist with pericare as needed.  - Provide VTE prophylaxis as needed.  - Monitor bowel function.  - Encourage ambulation and provide assistance as needed.  - Assess and monitor emotional status and provide social service/psych resources as needed.  - Utilize standard precautions and use personal protective equipment as indicated. Ensure aseptic care of all intravenous lines and invasive tubes/drains.  - Obtain immunization and exposure to communicable diseases history.  Outcome: Progressing  Goal: Optimize infant feeding at the  breast  Description: INTERVENTIONS:  - Initiate breast feeding within first hour after birth.   - Monitor effectiveness of current breast feeding efforts.  - Assess support systems available to mother/family.  - Identify cultural beliefs/practices regarding lactation, letdown techniques, maternal food preferences.  - Assess mother's knowledge and previous experience with breast feeding.  - Provide information as needed about early infant feeding cues (e.g., rooting, lip smacking, sucking fingers/hand) versus late cue of crying.  - Discuss/demonstrate breast feeding aids (e.g., infant sling, nursing footstool/pillows, and breast pumps).  - Encourage mother/other family members to express feelings/concerns, and actively listen.  - Educate father/SO about benefits of breast feeding and how to manage common lactation challenges.  - Recommend avoidance of specific medications or substances incompatible with breast feeding.  - Assess and monitor for signs of nipple pain/trauma.  - Instruct and provide assistance with proper latch.  - Review techniques for milk expression (breast pumping) and storage of breast milk. Provide pumping equipment/supplies, instructions and assistance, as needed.  - Encourage rooming-in and breast feeding on demand.  - Encourage skin-to-skin contact.  - Provide LC support as needed.  - Assess for and manage engorgement.  - Provide breast feeding education handouts and information on community breast feeding support.   Outcome: Progressing  Goal: Establishment of adequate milk supply with medication/procedure interruptions  Description: INTERVENTIONS:  - Review techniques for milk expression (breast pumping).   - Provide pumping equipment/supplies, instructions, and assistance until it is safe to breastfeed infant.  Outcome: Progressing  Goal: Experiences normal breast weaning course  Description: INTERVENTIONS:  - Assess for and manage engorgement.  - Instruct on breast care.  - Provide comfort  measures.  Outcome: Progressing  Goal: Appropriate maternal -  bonding  Description: INTERVENTIONS:  - Assess caregiver- interactions.  - Assess caregiver's emotional status and coping mechanisms.  - Encourage caregiver to participate in  daily care.  - Assess support systems available to mother/family.  - Provide /case management support as needed.  Outcome: Progressing

## 2024-03-22 NOTE — PROGRESS NOTES
[facility]  part of [facility]    OB/Gyne Post  Progress Note      Montse Arias Patient Status:  Inpatient    1995 MRN [id number]   Location [facility] 3SE Attending Raven Hall MD   Hosp Day # 1 PCP [place], DO       Subjective     Pt denies N/V/F/C/CP/SOB/palpitations, dizziness, headache, blurry vision, leg pain/calf pain.       Good pain control.   Tolerating present diet.   Ambulating well. Voiding freely.  Breastfeeding: Yes will be trying.  She admits breastfeeding did not work with her 1st but will try with this one and see. Supplementing as well.  Vaginal bleeding: Decreasing     Objective   Vital signs in last 24 hours:  Temp:  [97.4 °F (36.3 °C)-98.1 °F (36.7 °C)] 98 °F (36.7 °C)  Pulse:  [64-85] 72  Resp:  [14-16] 16  BP: (106-124)/(49-69) 116/69    Input/Output:    Intake/Output Summary (Last 24 hours) at 3/22/2024 0813  Last data filed at 3/21/2024 1650  Gross per 24 hour   Intake --   Output 750 ml   Net -750 ml         Constitutional: comfortable  Abdomen: soft, nontender, nondistended  Uterus: fundus firm and 1 cm below umbilicus,   Extremities: No calf tenderness; no c/c/e      Results:   Labs / Path / Radiology:    Recent Results (from the past 24 hour(s))   CBC W/ DIFFERENTIAL    Collection Time: 24  5:59 AM   Result Value Ref Range    WBC 9.8 4.0 - 11.0 x10(3) uL    RBC 3.57 (L) 3.80 - 5.30 x10(6)uL    HGB 11.8 (L) 12.0 - 16.0 g/dL    HCT 33.4 (L) 35.0 - 48.0 %    MCV 93.6 80.0 - 100.0 fL    MCH 33.1 26.0 - 34.0 pg    MCHC 35.3 31.0 - 37.0 g/dL    RDW-SD 42.9 35.1 - 46.3 fL    RDW 12.4 11.0 - 15.0 %    .0 150.0 - 450.0 10(3)uL    Neutrophil Absolute Prelim 5.98 1.50 - 7.70 x10 (3) uL    Neutrophil Absolute 5.98 1.50 - 7.70 x10(3) uL    Lymphocyte Absolute 2.81 1.00 - 4.00 x10(3) uL    Monocyte Absolute 0.77 0.10 - 1.00 x10(3) uL    Eosinophil Absolute 0.09 0.00 - 0.70 x10(3) uL    Basophil Absolute 0.05 0.00 - 0.20 x10(3) uL     Immature Granulocyte Absolute 0.06 0.00 - 1.00 x10(3) uL    Neutrophil % 61.3 %    Lymphocyte % 28.8 %    Monocyte % 7.9 %    Eosinophil % 0.9 %    Basophil % 0.5 %    Immature Granulocyte % 0.6 %       Specimens (From admission, onward)      None            No results found.      Assessment/Plan   28 year oldyo  , s/p induced vaginal, PPD# 1       Patient Active Problem List   Diagnosis    Supervision of normal first pregnancy, antepartum ([facility])    COVID-19 affecting pregnancy in second trimester ([facility])    Pregnancy ([facility])    Pregnant ([facility])    Vaginal delivery ([facility])   .    Postpartum:  -Pt doing well  -Pain tolerable and controlled  -Breastfeeding will try and supplementing, lactation consultant available fo assistance    2. Anemia:  Hgb s/p delivery 11.8  Most likelly 2/2 delivery  Asymptomatic and hemodynamically stable  Will encourage cont PNV and increase intake of iron rich foods    3. Disposition:  discharge home with discharge instructions reviewed in detail  Pt desires to go home today and comfortable about going home, discharge home today  Rx for motrin and colace given  Home instructions given and pt verbalized understanding  Pt to call the office and schedule an appt in 6 wks for PP visit  If any concerns or questions arise, pt to call the office and make an appt sooner for reevaluation.      Lissett Hobbs MD  3/22/2024

## 2024-03-22 NOTE — PLAN OF CARE
Problem: Patient Centered Care  Goal: Patient preferences are identified and integrated in the patient's plan of care  Description: Interventions:  - What would you like us to know as we care for you?   - Provide timely, complete, and accurate information to patient/family  - Incorporate patient and family knowledge, values, beliefs, and cultural backgrounds into the planning and delivery of care  - Encourage patient/family to participate in care and decision-making at the level they choose  - Honor patient and family perspectives and choices  Outcome: Progressing     Problem: Patient/Family Goals  Goal: Patient/Family Long Term Goal  Description: Patient's Long Term Goal:     Interventions:  -   - See additional Care Plan goals for specific interventions  Outcome: Progressing  Goal: Patient/Family Short Term Goal  Description: Patient's Short Term Goal:     Interventions:   -   - See additional Care Plan goals for specific interventions  Outcome: Progressing     Problem: POSTPARTUM  Goal: Long Term Goal:Experiences normal postpartum course  Description: INTERVENTIONS:  - Assess and monitor vital signs and lab values.  - Assess fundus and lochia.  - Provide ice/sitz baths for perineum discomfort.  - Monitor healing of incision/episiotomy/laceration, and assess for signs and symptoms of infection and hematoma.  - Assess bladder function and monitor for bladder distention.  - Provide/instruct/assist with pericare as needed.  - Provide VTE prophylaxis as needed.  - Monitor bowel function.  - Encourage ambulation and provide assistance as needed.  - Assess and monitor emotional status and provide social service/psych resources as needed.  - Utilize standard precautions and use personal protective equipment as indicated. Ensure aseptic care of all intravenous lines and invasive tubes/drains.  - Obtain immunization and exposure to communicable diseases history.  Outcome: Progressing  Goal: Optimize infant feeding at the  breast  Description: INTERVENTIONS:  - Initiate breast feeding within first hour after birth.   - Monitor effectiveness of current breast feeding efforts.  - Assess support systems available to mother/family.  - Identify cultural beliefs/practices regarding lactation, letdown techniques, maternal food preferences.  - Assess mother's knowledge and previous experience with breast feeding.  - Provide information as needed about early infant feeding cues (e.g., rooting, lip smacking, sucking fingers/hand) versus late cue of crying.  - Discuss/demonstrate breast feeding aids (e.g., infant sling, nursing footstool/pillows, and breast pumps).  - Encourage mother/other family members to express feelings/concerns, and actively listen.  - Educate father/SO about benefits of breast feeding and how to manage common lactation challenges.  - Recommend avoidance of specific medications or substances incompatible with breast feeding.  - Assess and monitor for signs of nipple pain/trauma.  - Instruct and provide assistance with proper latch.  - Review techniques for milk expression (breast pumping) and storage of breast milk. Provide pumping equipment/supplies, instructions and assistance, as needed.  - Encourage rooming-in and breast feeding on demand.  - Encourage skin-to-skin contact.  - Provide LC support as needed.  - Assess for and manage engorgement.  - Provide breast feeding education handouts and information on community breast feeding support.   Outcome: Progressing  Goal: Establishment of adequate milk supply with medication/procedure interruptions  Description: INTERVENTIONS:  - Review techniques for milk expression (breast pumping).   - Provide pumping equipment/supplies, instructions, and assistance until it is safe to breastfeed infant.  Outcome: Progressing  Goal: Experiences normal breast weaning course  Description: INTERVENTIONS:  - Assess for and manage engorgement.  - Instruct on breast care.  - Provide comfort  measures.  Outcome: Progressing  Goal: Appropriate maternal -  bonding  Description: INTERVENTIONS:  - Assess caregiver- interactions.  - Assess caregiver's emotional status and coping mechanisms.  - Encourage caregiver to participate in  daily care.  - Assess support systems available to mother/family.  - Provide /case management support as needed.  Outcome: Progressing     VSS, afebrile. Voiding freely. Fundus is firm, U/1, bleeding is scant. Plan of care reviewed with pt. Questions and concerns answered. Bonding well with baby. Will continue with plan of care.

## 2024-05-02 ENCOUNTER — TELEPHONE (OUTPATIENT)
Dept: OBGYN UNIT | Facility: HOSPITAL | Age: 29
End: 2024-05-02

## (undated) NOTE — LETTER
Dear New Mom,    We hope you are doing well. If, for any reason, you have questions or concerns about your health or your baby’s health, please contact your provider or your pediatrician or family medicine physician regarding your baby.     At [facility], we feel that postpartum support is very important for new families. Please see the enclosed new parent support flyer that lists support programs and resources with both in-person and online options.     Additionally, our Breastfeeding Centers at [facility] and [facility] in [place], offer outpatient visits with our International Board-Certified Lactation   Consultants (IBCLCs) for any breastfeeding concerns or questions you may have.    For issues related to stress, anxiety or depression, we have a Nurturing Mom support group that meets both in-person or online.  There’s also a 24-hour Mom’s Line where you can request a phone call from a clinical therapist for assistance for postpartum depression.    We encourage you to take advantage of these programs and resources as you recover from childbirth and learn to care for your new infant.    Best wishes,    [facility]            [id number]

## (undated) NOTE — LETTER
[facility]  Administration of Anesthesia  IMontse agree to be cared for by a physician anesthesiologist alone and/or with a nurse anesthetist, who is specially trained to monitor me and give me medicine to put me to sleep or keep me comfortable during my procedure    I understand that my anesthesiologist and/or anesthetist is not an employee or agent of [facility] or [employer]. He or she works for [facility]    As the patient asking for anesthesia services, I agree to:  Allow the anesthesiologist (anesthesia doctor) to give me medicine and do additional procedures as necessary. Some examples are: Starting or using an “IV” to give me medicine, fluids or blood during my procedure, and having a breathing tube placed to help me breathe when I’m asleep (intubation). In the event that my heart stops working properly, I understand that my anesthesiologist will make every effort to sustain my life, unless otherwise directed by [facility] Do Not Resuscitate documents.  Tell my anesthesia doctor before my procedure:  If I am pregnant.  The last time that I ate or drank.  iii. All of the medicines I take (including prescriptions, herbal supplements, and pills I can buy without a prescription (including street drugs/illegal medications). Failure to inform my anesthesiologist about these medicines may increase my risk of anesthetic complications.  iv.If I am allergic to anything or have had a reaction to anesthesia before.  I understand how the anesthesia medicine will help me (benefits).  I understand that with any type of anesthesia medicine there are risks:  The most common risks are: nausea, vomiting, sore throat, muscle soreness, damage to my eyes, mouth, or teeth (from breathing tube placement).  Rare risks include: remembering what happened during my procedure, allergic reactions to medications, injury to my airway, heart, lungs, vision, nerves, or  muscles and in extremely rare instances death.  My doctor has explained to me other choices available to me for my care (alternatives).  Pregnant Patients (“epidural”):  I understand that the risks of having an epidural (medicine given into my back to help control pain during labor), include itching, low blood pressure, difficulty urinating, headache or slowing of the baby’s heart. Very rare risks include infection, bleeding, seizure, irregular heart rhythms and nerve injury.  Regional Anesthesia (“spinal”, “epidural”, & “nerve blocks”):  I understand that rare but potential complications include headache, bleeding, infection, seizure, irregular heart rhythms, and nerve injury.    _____________________________________________________________________________  Patient (or Representative) Signature/Relationship to Patient  Date   Time    _____________________________________________________________________________   Name (if used)    Language/Organization   Time    _____________________________________________________________________________  Nurse Anesthetist Signature     Date   Time  _____________________________________________________________________________  Anesthesiologist Signature     Date   Time  I have discussed the procedure and information above with the patient (or patient’s representative) and answered their questions. The patient or their representative has agreed to have anesthesia services.    _____________________________________________________________________________  Witness        Date   Time  I have verified that the signature is that of the patient or patient’s representative, and that it was signed before the procedure  Patient Name: Montse Arias     : 1995                 Printed: 3/21/2024 at 4:58 AM    Medical Record #: [id number]                                            Page 1 of 1  ----------ANESTHESIA CONSENT----------